# Patient Record
Sex: FEMALE | Race: WHITE | Employment: UNEMPLOYED | URBAN - METROPOLITAN AREA
[De-identification: names, ages, dates, MRNs, and addresses within clinical notes are randomized per-mention and may not be internally consistent; named-entity substitution may affect disease eponyms.]

---

## 2018-04-18 ENCOUNTER — TELEPHONE (OUTPATIENT)
Dept: OBGYN CLINIC | Age: 38
End: 2018-04-18

## 2018-04-18 NOTE — TELEPHONE ENCOUNTER
Jessica King LPN        Caller: Unspecified (Today,  8:37 AM)                     Dr Mukul Kline called and spoke with the patient.

## 2018-04-18 NOTE — TELEPHONE ENCOUNTER
Patient calling stating that this morning she felt pressure and sharp pain in her LLQ and during her bowel movement it felt like something ripped and then she noticed she has having vaginal bleeding that is a medium flow. Patient reports that she has residual pain still and is concerned. There is an  Opening today, does patient need an US or just an office visit?     Please advise

## 2018-06-28 ENCOUNTER — OFFICE VISIT (OUTPATIENT)
Dept: OBGYN CLINIC | Age: 38
End: 2018-06-28

## 2018-06-28 VITALS
WEIGHT: 148.13 LBS | BODY MASS INDEX: 22.52 KG/M2 | SYSTOLIC BLOOD PRESSURE: 118 MMHG | DIASTOLIC BLOOD PRESSURE: 70 MMHG

## 2018-06-28 DIAGNOSIS — Z01.419 ENCOUNTER FOR GYNECOLOGICAL EXAMINATION WITHOUT ABNORMAL FINDING: Primary | ICD-10-CM

## 2018-06-28 NOTE — PROGRESS NOTES
Annual exam ages 21-44    Bea Vo is a ,  41 y.o. female PATIENT REFUSED Patient's last menstrual period was 2018 (approximate). .    She presents for her annual checkup. She is having no significant problems. Kids now 8, 8, 5 and twins 4. Menstrual status:    Her periods are moderate in flow. She is using three to five pads or tampons per day, usually regular and last 26-30 days. She denies dysmenorrhea. She reports no premenstrual symptoms. Contraception:    The current method of family planning is vasectomy. Sexual history:     She  reports that she currently engages in sexual activity and has had male partners. She reports using the following method of birth control/protection: Surgical..    Medical conditions:    Since her last annual GYN exam about one year ago, she has not the following changes in her health history: none. Pap and Mammogram History:    Her most recent Pap smear was normal, obtained 1 year(s) ago. Breast Cancer History/Substance Abuse: negative    Past Medical History:   Diagnosis Date    Abnormal Pap smear      had leep    Phlebitis and thrombophlebitis of unspecified site     pubic area and lower leg varicosities     Past Surgical History:   Procedure Laterality Date    HX DILATION AND CURETTAGE      HX HERNIA REPAIR  child    umbilical    HX LEEP PROCEDURE      HX WISDOM TEETH EXTRACTION         Current Outpatient Prescriptions   Medication Sig Dispense Refill    MULTIVITAMIN PO Take  by mouth.  fish oil-dha-epa (FISH OIL) 1,200-144-216 mg Cap Take 1 Cap by mouth daily.  oxyCODONE-acetaminophen (PERCOCET) 5-325 mg per tablet Take 1 Tab by mouth every four (4) hours as needed for Pain. 20 Tab 0    ibuprofen (MOTRIN) 600 mg tablet Take 1 Tab by mouth every eight (8) hours as needed for Pain. 36 Tab 2    prenatal vit-iron fumarate-fa (PRENATAL VITAMIN) 27-0.8 mg Tab tablet Take 1 Tab by mouth daily.        Allergies: Grapefruit     Tobacco History:  reports that she has never smoked. She has never used smokeless tobacco.  Alcohol Abuse:  reports that she drinks alcohol. Drug Abuse:  reports that she does not use illicit drugs.     Family Medical/Cancer History:   Family History   Problem Relation Age of Onset    Asthma Father     Heart Disease Father     Hypertension Father     Asthma Brother         Review of Systems - History obtained from the patient  Constitutional: negative for weight loss, fever, night sweats  HEENT: negative for hearing loss, earache, congestion, snoring, sorethroat  CV: negative for chest pain, palpitations, edema  Resp: negative for cough, shortness of breath, wheezing  GI: negative for change in bowel habits, abdominal pain, black or bloody stools  : negative for frequency, dysuria, hematuria, vaginal discharge  MSK: negative for back pain, joint pain, muscle pain  Breast: negative for breast lumps, nipple discharge, galactorrhea  Skin :negative for itching, rash, hives  Neuro: negative for dizziness, headache, confusion, weakness  Psych: negative for anxiety, depression, change in mood  Heme/lymph: negative for bleeding, bruising, pallor    Physical Exam    Visit Vitals    /70 (BP 1 Location: Left arm, BP Patient Position: Sitting)    Wt 148 lb 2 oz (67.2 kg)    LMP 06/25/2018 (Approximate)    Breastfeeding No    BMI 22.52 kg/m2       Constitutional  · Appearance: well-nourished, well developed, alert, in no acute distress    HENT  · Head and Face: appears normal    Chest  · Respiratory Effort: breathing unlabored    Breasts  · Inspection of Breasts: breasts symmetrical, no skin changes, no discharge present, nipple appearance normal, no skin retraction present  · Palpation of Breasts and Axillae: no masses present on palpation, no breast tenderness  · Axillary Lymph Nodes: no lymphadenopathy present    Gastrointestinal  · Abdominal Examination: abdomen non-tender to palpation, normal bowel sounds, no masses present  · Liver and spleen: no hepatomegaly present, spleen not palpable  · Hernias: no hernias identified    Genitourinary  · External Genitalia: normal appearance for age, no discharge present, no tenderness present, no inflammatory lesions present, no masses present, no atrophy present  · Vagina: normal vaginal vault without central or paravaginal defects, no discharge present, no inflammatory lesions present, no masses present  · Bladder: non-tender to palpation  · Urethra: appears normal  · Cervix: normal   · Uterus: normal size, shape and consistency  · Adnexa: no adnexal tenderness present, no adnexal masses present  · Perineum: perineum within normal limits, no evidence of trauma, no rashes or skin lesions present  · Anus: anus within normal limits, no hemorrhoids present  · Inguinal Lymph Nodes: no lymphadenopathy present    Skin  · General Inspection: no rash, no lesions identified    Neurologic/Psychiatric  · Mental Status:  · Orientation: grossly oriented to person, place and time  · Mood and Affect: mood normal, affect appropriate    . Assessment:  Routine gynecologic examination  Her current medical status is satisfactory with no evidence of significant gynecologic issues.     Plan:  GC/ chlamydia offered and declined  Counseled re: diet, exercise, healthy lifestyle  Return for yearly wellness visits  Gardisil counseling provided  Rec screening mammo at either 36 or 41

## 2018-06-28 NOTE — PATIENT INSTRUCTIONS
Pelvic Exam: Care Instructions  Your Care Instructions    When your doctor examines all of your pelvic organs, it's called a pelvic exam. Two good reasons to have this kind of exam are to check for sexually transmitted infections (STIs) and to get a Pap test. A Pap test is also called a Pap smear. It checks for early changes that can lead to cancer of the cervix. Sometimes a pelvic exam is part of a regular checkup. In this case, you can do some things to make your test results as accurate as possible. · Try to schedule the exam when you don't have your period. · Don't use douches, tampons, or vaginal medicines, sprays, or powders for 24 hours before your exam.  · Don't have sex for 24 hours before your exam.  Other times, women have this kind of exam at any time of the month. This is because they have pelvic pain, bleeding, or discharge. Or they may have another pelvic problem. Before your exam, it's important to share some information with your doctor. For example, if you are a survivor of rape or sexual abuse, you can talk about any concerns you may have. Your doctor will also want to know if you are pregnant or use birth control. And he or she will want to hear about any problems, surgeries, or procedures you have had in your pelvic area. You will also need to tell your doctor when your last period was. Follow-up care is a key part of your treatment and safety. Be sure to make and go to all appointments, and call your doctor if you are having problems. It's also a good idea to know your test results and keep a list of the medicines you take. How is a pelvic exam done? · During a pelvic exam, you will:  ¨ Take off your clothes below the waist. You will get a paper or cloth cover to put over the lower half of your body. Jesusita Hirsch on your back on an exam table. Your feet will be raised above you. Stirrups will support your feet. · The doctor will:  Venancio Stanton you to relax your knees.  Your knees need to lean out, toward the walls. ¨ Check the opening of your vagina for sores or swelling. ¨ Gently put a tool called a speculum into your vagina. It opens the vagina a little bit. You will feel some pressure. But if you are relaxed, it will not hurt. It lets your doctor see inside the vagina. ¨ Use a small brush, spatula, or swab to get a sample of cells, if you are having a Pap test or culture. The doctor then removes the speculum. ¨ Put on gloves and put one or two fingers of one hand into your vagina. The other hand goes on your lower belly. This lets your doctor feel your pelvic organs. You will probably feel some pressure. Try to stay relaxed. ¨ Put one gloved finger into your rectum and one into your vagina, if needed. This can also help check your pelvic organs. This exam takes about 10 minutes. At the end, you will get a washcloth or tissue to clean your vaginal area. It's normal to have some discharge after this exam. You can then get dressed. Some test results may be ready right away. But results from a culture or a Pap test may take several days or a few weeks. Why should you have a pelvic exam?  · You want to have recommended screening tests. This includes a Pap test.  · You think you have a vaginal infection. Signs include itching, burning, or unusual discharge. · You might have been exposed to a sexually transmitted infection (STI), such as chlamydia or herpes. · You have vaginal bleeding that is not part of your normal menstrual period. · You have pain in your belly or pelvis. · You have been sexually assaulted. A pelvic exam lets your doctor collect evidence and check for STIs. · You are pregnant. · You are having trouble getting pregnant. What are the risks of a pelvic exam?  There are no risks from a pelvic exam.  When should you call for help? Watch closely for changes in your health, and be sure to contact your doctor if you have any problems. Where can you learn more?   Go to http://loni-jemma.info/. Enter W846 in the search box to learn more about \"Pelvic Exam: Care Instructions. \"  Current as of: October 13, 2016  Content Version: 11.4  © 9435-3009 [employer], [employer]. Care instructions adapted under license by [employer] (which disclaims liability or warranty for this information). If you have questions about a medical condition or this instruction, always ask your healthcare professional. [place] [address] any warranty or liability for your use of this information.

## 2018-07-03 LAB
CYTOLOGIST CVX/VAG CYTO: NORMAL
CYTOLOGY CVX/VAG DOC THIN PREP: NORMAL
DX ICD CODE: NORMAL
LABCORP, 190119: NORMAL
Lab: NORMAL
Lab: NORMAL
OTHER STN SPEC: NORMAL
PATH REPORT.FINAL DX SPEC: NORMAL
STAT OF ADQ CVX/VAG CYTO-IMP: NORMAL

## 2018-10-22 ENCOUNTER — OFFICE VISIT (OUTPATIENT)
Dept: NEUROLOGY | Age: 38
End: 2018-10-22

## 2018-10-22 VITALS
WEIGHT: 149.1 LBS | DIASTOLIC BLOOD PRESSURE: 60 MMHG | SYSTOLIC BLOOD PRESSURE: 106 MMHG | OXYGEN SATURATION: 99 % | HEIGHT: 68 IN | BODY MASS INDEX: 22.6 KG/M2 | RESPIRATION RATE: 16 BRPM | HEART RATE: 82 BPM

## 2018-10-22 DIAGNOSIS — H47.10 PAPILLEDEMA: Primary | ICD-10-CM

## 2018-10-22 NOTE — PROGRESS NOTES
Papilledema. Neurology Consult      Subjective:      Lila Kline is a 41 y.o. female Who comes in today with the following history. Says that she has had headaches for about 2 and half years following the birth of her twins. Would have this positional type dizziness and nausea that haunted her but all workups and investigations were unrevealing. Says she is feeling some better. Described a head tightness feeling that comes and goes about every other day and a variable pressures feeling in her ears. Recently saw Dr. Luisa Pedroza of ENT who reassured her everything looked normal.  Her weight has been steady-state and she is on a very simplistic medication schedule and does not use any homeopathic medicines etc. vision has been okay but occasionally slightly out of focus but no breakthrough diplopia amaurosis or or true transient visual obscurations etc. has noticed some very self-limited and random changes of hearing in the ears but it is not predictable or trending. Maybe once a week she may hear her pulse in her ears? Interestingly just had a routine eye visit and mild papilledema was discovered with plans tomorrow to get automated perimetry in that office. Fundus photos were very impressive and graphic. Says that prior to her last eye visit, 14 months ago she had a different eye checkup with a different group and everything was [place]. Is due to get a brain MRI with attention to the orbits with and without contrast that is pending and please see discussion below. Current Outpatient Medications   Medication Sig Dispense Refill    MULTIVITAMIN PO Take  by mouth.  fish oil-dha-epa (FISH OIL) 1,200-144-216 mg Cap Take 1 Cap by mouth daily.         Allergies   Allergen Reactions    Grapefruit Swelling     Throat swelling     Past Medical History:   Diagnosis Date    Abnormal Pap smear     2003 had leep    Headache     Phlebitis and thrombophlebitis of unspecified site     pubic area and lower leg varicosities    Tinnitus       Past Surgical History:   Procedure Laterality Date    HX DILATION AND CURETTAGE      HX HERNIA REPAIR  child    umbilical    HX LEEP PROCEDURE      HX WISDOM TEETH EXTRACTION        Social History     Socioeconomic History    Marital status:      Spouse name: Not on file    Number of children: Not on file    Years of education: Not on file    Highest education level: Not on file   Social Needs    Financial resource strain: Not on file    Food insecurity - worry: Not on file    Food insecurity - inability: Not on file   [place] needs - medical: Not on file   [place] needs - non-medical: Not on file   Occupational History    Not on file   Tobacco Use    Smoking status: Never Smoker    Smokeless tobacco: Never Used   Substance and Sexual Activity    Alcohol use: Yes     Alcohol/week: 0.6 oz     Types: 1 Glasses of wine per week    Drug use: No    Sexual activity: Yes     Partners: Male     Birth control/protection: Surgical     Comment: Vasectomy   Other Topics Concern    Not on file   Social History Narrative    Not on file      Family History   Problem Relation Age of Onset    Asthma Father     Heart Disease Father     Hypertension Father     Asthma Brother     Cancer Maternal Grandmother     Cancer Maternal Grandfather     Stroke Paternal Grandmother     Seizures Paternal Grandfather       Visit Vitals  /60   Pulse 82   Resp 16   Ht 5' 8\" (1.727 m)   Wt 67.6 kg (149 lb 1.6 oz)   LMP 10/08/2018 (Approximate)   SpO2 99%   BMI 22.67 kg/m²        Review of Systems:   A comprehensive review of systems was negative except for that written in the HPI. Neuro Exam:     Appearance: The patient is well developed, well nourished, provides a coherent history and is in no acute distress. Mental Status: Oriented to time, place and person. Mood and affect appropriate. Cranial Nerves:   Intact visual fields.  Fundi are remarkable for circumpapillary mild disc elevation and very interesting tortuous veins and arterial systems. Noteworthy, at one point in each eye, especially on the right, I thought I saw a very transient admittedly obscure venous pulsations. BEATRIS, EOM's full, no nystagmus, no ptosis. Facial sensation is normal. Corneal reflexes are intact. Facial movement is symmetric. Hearing is normal bilaterally. Palate is midline with normal sternocleidomastoid and trapezius muscles are normal. Tongue is midline. Visual acuity near with correction 20/20 OU and Amsler grid normal.   Motor:  5/5 strength in upper and lower proximal and distal muscles. Normal bulk and tone. No fasciculations. Reflexes:   Deep tendon reflexes 2+/4 and symmetrical.   Sensory:   Normal to touch, pinprick and vibration. Gait:  Normal gait. Tremor:   No tremor noted. Cerebellar:  No cerebellar signs present. Neurovascular:  Normal heart sounds and regular rhythm, peripheral pulses intact, and no carotid bruits. Assessment:   Papilledema. A very interesting story with some atypicality to it no doubt. As it is, is scheduled for a brain MRI with orbital cuts and would like to add a brain MRV to the imaging process as well. Beyond that would like to get these results and make our next clinical decision making. Plan:   Revisit pended for now. Depending on initial imaging results could include an LP with opening pressure and check of spinal fluid constituents.   Signed by :  Juan C Fleming MD

## 2018-10-22 NOTE — PATIENT INSTRUCTIONS
[facility]   Statement to Patients  April 1, 2014      In an effort to ensure the large volume of patient prescription refills is processed in the most efficient and expeditious manner, we are asking our patients to assist us by calling your Pharmacy for all prescription refills, this will include also your  Mail Order Pharmacy. The pharmacy will contact our office electronically to continue the refill process. Please do not wait until the last minute to call your pharmacy. We need at least 48 hours (2days) to fill prescriptions. We also encourage you to call your pharmacy before going to  your prescription to make sure it is ready. With regard to controlled substance prescription refill requests (narcotic refills) that need to be picked up at our office, we ask your cooperation by providing us with at least 72 hours (3days) notice that you will need a refill. We will not refill narcotic prescription refill requests after 4:00pm on any weekday, Monday through Thursday, or after 2:00pm on Fridays, or on the weekends. We encourage everyone to explore another way of getting your prescription refill request processed using Partnered, our patient web portal through our electronic medical record system. Partnered is an efficient and effective way to communicate your medication request directly to the office and  downloadable as an jorge on your smart phone . Partnered also features a review functionality that allows you to view your medication list as well as leave messages for your physician. Are you ready to get connected? If so please review the attatched instructions or speak to any of our staff to get you set up right away! Thank you so much for your cooperation. Should you have any questions please contact our Practice Administrator.     The Physicians and Staff,  [facility]                A Healthy Lifestyle: Care Instructions  Your Care Instructions    A healthy lifestyle can help you feel good, stay at a healthy weight, and have plenty of energy for both work and play. A healthy lifestyle is something you can share with your whole family. A healthy lifestyle also can lower your risk for serious health problems, such as high blood pressure, heart disease, and diabetes. You can follow a few steps listed below to improve your health and the health of your family. Follow-up care is a key part of your treatment and safety. Be sure to make and go to all appointments, and call your doctor if you are having problems. It's also a good idea to know your test results and keep a list of the medicines you take. How can you care for yourself at home? · Do not eat too much sugar, fat, or fast foods. You can still have dessert and treats now and then. The goal is moderation. · Start small to improve your eating habits. Pay attention to portion sizes, drink less juice and soda pop, and eat more fruits and vegetables. ? Eat a healthy amount of food. A 3-ounce serving of meat, for example, is about the size of a deck of cards. Fill the rest of your plate with vegetables and whole grains. ? Limit the amount of soda and sports drinks you have every day. Drink more water when you are thirsty. ? Eat at least 5 servings of fruits and vegetables every day. It may seem like a lot, but it is not hard to reach this goal. A serving or helping is 1 piece of fruit, 1 cup of vegetables, or 2 cups of leafy, raw vegetables. Have an apple or some carrot sticks as an afternoon snack instead of a candy bar. Try to have fruits and/or vegetables at every meal.  · Make exercise part of your daily routine. You may want to start with simple activities, such as walking, bicycling, or slow swimming. Try to be active 30 to 60 minutes every day. You do not need to do all 30 to 60 minutes all at once. For example, you can exercise 3 times a day for 10 or 20 minutes.  Moderate exercise is safe for most people, but it is always a good idea to talk to your doctor before starting an exercise program.  · Keep moving. Jereld Mcardle the lawn, work in the garden, or [employer]. Take the stairs instead of the elevator at work. · If you smoke, quit. People who smoke have an increased risk for heart attack, stroke, cancer, and other lung illnesses. Quitting is hard, but there are ways to boost your chance of quitting tobacco for good. ? Use nicotine gum, patches, or lozenges. ? Ask your doctor about stop-smoking programs and medicines. ? Keep trying. In addition to reducing your risk of diseases in the future, you will notice some benefits soon after you stop using tobacco. If you have shortness of breath or asthma symptoms, they will likely get better within a few weeks after you quit. · Limit how much alcohol you drink. Moderate amounts of alcohol (up to 2 drinks a day for men, 1 drink a day for women) are okay. But drinking too much can lead to liver problems, high blood pressure, and other health problems. Family health  If you have a family, there are many things you can do together to improve your health. · Eat meals together as a family as often as possible. · Eat healthy foods. This includes fruits, vegetables, lean meats and dairy, and whole grains. · Include your family in your fitness plan. Most people think of activities such as jogging or tennis as the way to fitness, but there are many ways you and your family can be more active. Anything that makes you breathe hard and gets your heart pumping is exercise. Here are some tips:  ? Walk to do errands or to take your child to school or the bus.  ? Go for a family bike ride after dinner instead of watching TV. Where can you learn more? Go to http://loni-jemma.info/. Enter F362 in the search box to learn more about \"A Healthy Lifestyle: Care Instructions. \"  Current as of: December 7, 2017  Content Version: 11.8  © 0944-0778 Health[employer]. Care instructions adapted under license by [employer] (which disclaims liability or warranty for this information). If you have questions about a medical condition or this instruction, always ask your healthcare professional. [address] any warranty or liability for your use of this information. Learning About Living [place]  What is a living will? A living will is a legal form you use to write down the kind of care you want at the end of your life. It is used by the health professionals who will treat you if you aren't able to decide for yourself. If you put your wishes in writing, your loved ones and others will know what kind of care you want. They won't need to guess. This can ease your mind and be helpful to others. A living will is not the same as an estate or property will. An estate will explains what you want to happen with your money and property after you die. Is a living will a legal document? A living will is a legal document. Each state has its own laws about living [place]. If you move to another state, make sure that your living will is legal in the state where you now live. Or you might use a universal form that has been approved by many states. This kind of form can sometimes be completed and stored online. Your electronic copy will then be available wherever you have a connection to the Internet. In most cases, doctors will respect your wishes even if you have a form from a different state. · You don't need an  to complete a living will. But legal advice can be helpful if your state's laws are unclear, your health history is complicated, or your family can't agree on what should be in your living will. · You can change your living will at any time. Some people find that their wishes about end-of-life care change as their health changes.   · In addition to making a living will, think about completing a medical power of  form. This form lets you name the person you want to make end-of-life treatment decisions for you (your \"health care agent\") if you're not able to. Many hospitals and nursing homes will give you the forms you need to complete a living will and a medical power of . · Your living will is used only if you can't make or communicate decisions for yourself anymore. If you become able to make decisions again, you can accept or refuse any treatment, no matter what you wrote in your living will. · Your state may offer an online registry. This is a place where you can store your living will online so the doctors and nurses who need to treat you can find it right away. What should you think about when creating a living will? Talk about your end-of-life wishes with your family members and your doctor. Let them know what you want. That way the people making decisions for you won't be surprised by your choices. Think about these questions as you make your living will:  · Do you know enough about life support methods that might be used? If not, talk to your doctor so you know what might be done if you can't breathe on your own, your heart stops, or you're unable to swallow. · What things would you still want to be able to do after you receive life-support methods? Would you want to be able to walk? To speak? To eat on your own? To live without the help of machines? · If you have a choice, where do you want to be cared for? In your home? At a hospital or nursing home? · Do you want certain Latter-day practices performed if you become very ill? · If you have a choice at the end of your life, where would you prefer to die? At home? In a hospital or nursing home? Somewhere else? · Would you prefer to be buried or cremated? · Do you want your organs to be donated after you die? What should you do with your living will?   · Make sure that your family members and your health care agent have copies of your living will. · Give your doctor a copy of your living will to keep in your medical record. If you have more than one doctor, make sure that each one has a copy. · You may want to put a copy of your living will where it can be easily found. Where can you learn more? Go to http://loni-jemma.info/. Enter W232 in the search box to learn more about \"Learning About Living Bubba Ansari. \"  Current as of: April 19, 2018  Content Version: 11.8  © 8492-5669 [employer]. Care instructions adapted under license by [employer] (which disclaims liability or warranty for this information). If you have questions about a medical condition or this instruction, always ask your healthcare professional. [address] any warranty or liability for your use of this information. Patient history reviewed and patient examined. I can see where the case has materialized in recent times. We will check on the brain MRI scheduling and would like to add a brain MRV as well. Once this is accomplished we will decide what the next step is. An interesting case where the patient does not have the usual body habitus/story for idiopathic intracranial  hypertension at first glance.

## 2018-10-30 ENCOUNTER — HOSPITAL ENCOUNTER (OUTPATIENT)
Dept: MRI IMAGING | Age: 38
Discharge: HOME | End: 2018-10-30
Attending: OPHTHALMOLOGY
Payer: COMMERCIAL

## 2018-10-30 ENCOUNTER — HOSPITAL ENCOUNTER (OUTPATIENT)
Dept: MRI IMAGING | Age: 38
Discharge: HOME | End: 2018-10-30
Attending: SPECIALIST
Payer: COMMERCIAL

## 2018-10-30 DIAGNOSIS — H47.10 PAPILLEDEMA: ICD-10-CM

## 2018-10-30 PROCEDURE — 70551 MRI BRAIN STEM W/O DYE: CPT

## 2018-10-30 PROCEDURE — 70553 MRI BRAIN STEM W/O & W/DYE: CPT

## 2018-10-30 PROCEDURE — A9575 INJ GADOTERATE MEGLUMI 0.1ML: HCPCS | Performed by: SPECIALIST

## 2018-10-30 PROCEDURE — 74011250636 HC RX REV CODE- 250/636: Performed by: SPECIALIST

## 2018-10-30 RX ORDER — GADOTERATE MEGLUMINE 376.9 MG/ML
12 INJECTION INTRAVENOUS
Status: COMPLETED | OUTPATIENT
Start: 2018-10-30 | End: 2018-10-30

## 2018-10-30 RX ADMIN — GADOTERATE MEGLUMINE 12 ML: 376.9 INJECTION INTRAVENOUS at 15:59

## 2018-11-05 DIAGNOSIS — G93.2 IIH (IDIOPATHIC INTRACRANIAL HYPERTENSION): Primary | ICD-10-CM

## 2018-11-13 ENCOUNTER — HOSPITAL ENCOUNTER (OUTPATIENT)
Dept: GENERAL RADIOLOGY | Age: 38
Discharge: HOME | End: 2018-11-13
Attending: SPECIALIST
Payer: COMMERCIAL

## 2018-11-13 VITALS
HEART RATE: 67 BPM | SYSTOLIC BLOOD PRESSURE: 106 MMHG | RESPIRATION RATE: 16 BRPM | DIASTOLIC BLOOD PRESSURE: 71 MMHG | OXYGEN SATURATION: 100 %

## 2018-11-13 DIAGNOSIS — G93.2 IIH (IDIOPATHIC INTRACRANIAL HYPERTENSION): ICD-10-CM

## 2018-11-13 LAB
APPEARANCE CSF: CLEAR
APPEARANCE CSF: CLEAR
COLOR CSF: COLORLESS
COLOR CSF: COLORLESS
CRYPTOC AG CSF QL IA: NEGATIVE
GLUCOSE CSF-MCNC: 55 MG/DL (ref 40–70)
PROT CSF-MCNC: 38 MG/DL (ref 15–45)
RBC # CSF: 0 /CU MM
RBC # CSF: 0 /CU MM
TUBE # CSF: 1
TUBE # CSF: 3
WBC # CSF: 0 /CU MM (ref 0–5)
WBC # CSF: 0 /CU MM (ref 0–5)

## 2018-11-13 PROCEDURE — 87205 SMEAR GRAM STAIN: CPT

## 2018-11-13 PROCEDURE — 88108 CYTOPATH CONCENTRATE TECH: CPT

## 2018-11-13 PROCEDURE — 89050 BODY FLUID CELL COUNT: CPT

## 2018-11-13 PROCEDURE — 82945 GLUCOSE OTHER FLUID: CPT

## 2018-11-13 PROCEDURE — 74011250636 HC RX REV CODE- 250/636: Performed by: RADIOLOGY

## 2018-11-13 PROCEDURE — 77030003666 HC NDL SPINAL BD -A

## 2018-11-13 PROCEDURE — 62270 DX LMBR SPI PNXR: CPT

## 2018-11-13 PROCEDURE — 84157 ASSAY OF PROTEIN OTHER: CPT

## 2018-11-13 PROCEDURE — 87327 CRYPTOCOCCUS NEOFORM AG IA: CPT

## 2018-11-13 PROCEDURE — 77030019701 HC TY LUMBR PUNC SIMS -A

## 2018-11-13 PROCEDURE — 86618 LYME DISEASE ANTIBODY: CPT

## 2018-11-13 PROCEDURE — 86592 SYPHILIS TEST NON-TREP QUAL: CPT

## 2018-11-13 RX ORDER — LIDOCAINE HYDROCHLORIDE 10 MG/ML
10 INJECTION, SOLUTION EPIDURAL; INFILTRATION; INTRACAUDAL; PERINEURAL
Status: COMPLETED | OUTPATIENT
Start: 2018-11-13 | End: 2018-11-13

## 2018-11-13 RX ADMIN — LIDOCAINE HYDROCHLORIDE 5 ML: 10 INJECTION, SOLUTION EPIDURAL; INFILTRATION; INTRACAUDAL; PERINEURAL at 10:49

## 2018-11-13 NOTE — DISCHARGE INSTRUCTIONS
[address]    POST LUMBAR PUNCTURE/MYELOGRAM/INTRATHECAL CHEMOTHERAPY DISCHARGE INSTRUCTIONS  General Information:  Lumbar Puncture: A LP is done to help diagnose several disorders, like pseudo tumor, migraines, meningitis, and multiple sclerosis. It involves a puncture (usually in the lower spine) into the sac that protects the spinal column. A sample of the fluid in that space is removed and tested in the lab. Myelogram:   A Myelogram involves a lumbar puncture, and instead of removing fluid, contrast will be injected into the sac surrounding the spinal column. It is done to visualize the spinal column, nerve roots, spinal canal, vertebral discs and disc space. It is usually done to diagnose back pain with unknown cause or in preparation for surgery. After the injection, a CT scan will be done, usually within two hours of the injection. Intrathecal Chemotherapy:   Chemotherapy can be given in many forms. Intrathecal chemo involves a lumbar puncture, and instead of removing fluid, the chemo will be injected into the space. After any of these procedures, you will be asked to lie flat on your back for 4-6 hours to prevent complications. You should also rest for 24 hours after you go home, and force fluids. If you have a headache, you should take Tylenol or acetaminophen. Call If:   You should call your Physician and/or the Radiology Nurse if you develop a headache that is not relieved by Tylenol, and worsens when you stand and eases when you lie down, you need to call. You may have developed what is referred to as a spinal headache. Our physicians will probably advise you to be on strict bed rest for 24 hours, to drink lots of fluids and caffeine. If this does not help the head pain, call again the next day. You should call if you have bleeding other than a small spot on your bandage.  You should call if you have any numbness, tingling, weakness, fever, chills, urinary retention, severe itching, rash, welts, swelling, or confusion. Follow-up Instructions: See the doctor who ordered your procedure as he/she has instructed. If you had a Lumbar Puncture or Myelogram, your results should be available to your ordering doctor in 3-5 business days. You can remove your dressing in 24 hours and shower regularly. Do not bathe or swim for 72 hours.    To Reach Us: [phone]  After Hours: [phone]  Patient Signature:  Date: 11/13/2018  Discharging Nurse: Mike Araujo RN

## 2018-11-13 NOTE — PROGRESS NOTES
Pt received to [facility] on stretcher, states no pain, had a potential vagal response during procedure. Pt feels she is returning to normal, cool cloth on head, vitals stable. Toeprettyating PO, pt's family updated of status. Dressing on back CDI. D/c directions given/reviewed with patient. 1140--Taken out in wheelchair for discharge.

## 2018-11-15 LAB — REAGIN AB CSF QL: NON REACTIVE

## 2018-11-16 DIAGNOSIS — G93.2 IIH (IDIOPATHIC INTRACRANIAL HYPERTENSION): ICD-10-CM

## 2018-11-20 LAB
BACTERIA SPEC CULT: NORMAL
BACTERIA SPEC CULT: NORMAL
GRAM STN SPEC: NORMAL
SERVICE CMNT-IMP: NORMAL

## 2018-11-21 LAB
B BURGDOR IGG CSF-ACNC: 0.13 INDEX (ref 0–0.09)
B BURGDOR IGM CSF-ACNC: <0.06 INDEX (ref 0–0.06)

## 2018-12-26 ENCOUNTER — OFFICE VISIT (OUTPATIENT)
Dept: NEUROLOGY | Age: 38
End: 2018-12-26

## 2018-12-26 VITALS
BODY MASS INDEX: 22.64 KG/M2 | RESPIRATION RATE: 16 BRPM | HEIGHT: 68 IN | HEART RATE: 71 BPM | OXYGEN SATURATION: 95 % | SYSTOLIC BLOOD PRESSURE: 112 MMHG | DIASTOLIC BLOOD PRESSURE: 62 MMHG | WEIGHT: 149.4 LBS

## 2018-12-26 DIAGNOSIS — R51.9 WORSENING HEADACHES: Primary | ICD-10-CM

## 2018-12-26 NOTE — PROGRESS NOTES
Neurology Consult      Subjective:      Lila Kline is a 41 y.o. female Who comes in today following completion of outpatient testing. This included a normal brain MRV and an LP with an opening pressure of 25 cm but normal spinal fluid constituents. She said she had some difficulties with headache post LP but within a week or 2 that was clearly no longer the case and no headache history to speak of right now. The opening pressure is the limit of normal for adults but she has a very innocent body habitus and body mass index. Says she will pay close attention to any medications or antibiotics she is put on so that there cannot be any correlative effect to raising intracranial pressure. Again currently no history of breakout diplopia amaurosis TVO or similar phenomena. Seem to be very appreciative of the information that was relayed to her and I think she will continue to do well. If per chance she had come in with a headache persistence history with the opening pressure as it was, thought we could default to something less intense such as Topamax. Which could certainly be a responsible preventative headache drug but at the same time a carbonic anhydrase inhibitor as well. As it is we do not even have to concern ourselves with that option. We will see her back in 3 months. I did suggest she acquire a regular eye doctor and I think [facility] is an excellent organization. Current Outpatient Medications   Medication Sig Dispense Refill    MULTIVITAMIN PO Take  by mouth.  fish oil-dha-epa (FISH OIL) 1,200-144-216 mg Cap Take 1 Cap by mouth daily.         Allergies   Allergen Reactions    Grapefruit Swelling     Throat swelling     Past Medical History:   Diagnosis Date    Abnormal Pap smear     2003 had leep    Headache     Phlebitis and thrombophlebitis of unspecified site     pubic area and lower leg varicosities    Tinnitus       Past Surgical History:   Procedure Laterality Date  HX DILATION AND CURETTAGE      HX HERNIA REPAIR  child    umbilical    HX LEEP PROCEDURE      HX WISDOM TEETH EXTRACTION        Social History     Socioeconomic History    Marital status:      Spouse name: Not on file    Number of children: Not on file    Years of education: Not on file    Highest education level: Not on file   Social Needs    Financial resource strain: Not on file    Food insecurity - worry: Not on file    Food insecurity - inability: Not on file   [employer] needs - medical: Not on file   [employer] needs - non-medical: Not on file   Occupational History    Not on file   Tobacco Use    Smoking status: Never Smoker    Smokeless tobacco: Never Used   Substance and Sexual Activity    Alcohol use: Yes     Alcohol/week: 0.6 oz     Types: 1 Glasses of wine per week    Drug use: No    Sexual activity: Yes     Partners: Male     Birth control/protection: Surgical     Comment: Vasectomy   Other Topics Concern    Not on file   Social History Narrative    Not on file      Family History   Problem Relation Age of Onset    Asthma Father     Heart Disease Father     Hypertension Father     Asthma Brother     Cancer Maternal Grandmother     Cancer Maternal Grandfather     Stroke Paternal Grandmother     Seizures Paternal Grandfather       Visit Vitals  /62   Pulse 71   Resp 16   Ht 5' 8\" (1.727 m)   Wt 67.8 kg (149 lb 6.4 oz)   LMP 12/25/2018 (Exact Date)   SpO2 95%   BMI 22.72 kg/m²        Review of Systems:   A comprehensive review of systems was negative except for that written in the HPI. Neuro Exam:     Appearance: The patient is well developed, well nourished, provides a coherent history and is in no acute distress. Mental Status: Oriented to time, place and person. Mood and affect appropriate. Cranial Nerves:   Intact visual fields. Fundi are benign with tortuosity of vessels but evidence of spontaneous venous pulsations in both eyegrounds. BEATRIS, EOM's full, no nystagmus, no ptosis. Facial sensation is normal. Corneal reflexes are intact. Facial movement is symmetric. Hearing is normal bilaterally. Palate is midline with normal sternocleidomastoid and trapezius muscles are normal. Tongue is midline. Visual acuity near with correction 20/20 OU without hesitation and Amsler grid normal.  APD negative. Motor:  5/5 strength in upper and lower proximal and distal muscles. Normal bulk and tone. No fasciculations. Reflexes:   Deep tendon reflexes 2+/4 and symmetrical.   Sensory:   Normal to touch, pinprick and vibration. Gait:  Normal gait. Tremor:   No tremor noted. Cerebellar:  No cerebellar signs present. Neurovascular:  Normal heart sounds and regular rhythm, peripheral pulses intact, and no carotid bruits. Assessment:   History of previously sited headaches dizziness hearing changes self-limited random changes of hearing etc. please see above dictation for the objective outcome of LP and MRV. Is doing well now and really does not have any headaches to speak of. Would like for her to continue following up with [facility] for regular eye checkups and at this point would like to see her back in about 3 months. Exam look great today and again I thought I could see vestiges of spontaneous venous pulsations in both eyegrounds. Plan:   Revisit 3 months.   Signed by :  Hillary Johns MD

## 2019-01-05 ENCOUNTER — HOSPITAL ENCOUNTER (EMERGENCY)
Age: 39
Discharge: HOME | End: 2019-01-05
Attending: EMERGENCY MEDICINE
Payer: COMMERCIAL

## 2019-01-05 ENCOUNTER — HOSPITAL ENCOUNTER (EMERGENCY)
Age: 39
Discharge: STILL ADMITTED OR ADMINISTRATIVE | End: 2019-01-05
Attending: EMERGENCY MEDICINE
Payer: COMMERCIAL

## 2019-01-05 VITALS
OXYGEN SATURATION: 96 % | BODY MASS INDEX: 21.98 KG/M2 | WEIGHT: 145 LBS | SYSTOLIC BLOOD PRESSURE: 106 MMHG | RESPIRATION RATE: 16 BRPM | HEIGHT: 68 IN | TEMPERATURE: 98.4 F | HEART RATE: 79 BPM | DIASTOLIC BLOOD PRESSURE: 70 MMHG

## 2019-01-05 DIAGNOSIS — E87.6 HYPOKALEMIA: ICD-10-CM

## 2019-01-05 DIAGNOSIS — I49.3 PVC (PREMATURE VENTRICULAR CONTRACTION): ICD-10-CM

## 2019-01-05 DIAGNOSIS — I49.1 PAC (PREMATURE ATRIAL CONTRACTION): Primary | ICD-10-CM

## 2019-01-05 LAB
ALBUMIN SERPL-MCNC: 4.1 G/DL (ref 3.5–5)
ALBUMIN/GLOB SERPL: 1.1 {RATIO} (ref 1.1–2.2)
ALP SERPL-CCNC: 48 U/L (ref 45–117)
ALT SERPL-CCNC: 29 U/L (ref 12–78)
ANION GAP SERPL CALC-SCNC: 12 MMOL/L (ref 5–15)
AST SERPL-CCNC: 18 U/L (ref 15–37)
BASOPHILS # BLD: 0 K/UL (ref 0–0.1)
BASOPHILS NFR BLD: 0 % (ref 0–1)
BILIRUB SERPL-MCNC: 0.5 MG/DL (ref 0.2–1)
BUN SERPL-MCNC: 17 MG/DL (ref 6–20)
BUN/CREAT SERPL: 18 (ref 12–20)
CALCIUM SERPL-MCNC: 9.8 MG/DL (ref 8.5–10.1)
CHLORIDE SERPL-SCNC: 104 MMOL/L (ref 97–108)
CO2 SERPL-SCNC: 25 MMOL/L (ref 21–32)
CREAT SERPL-MCNC: 0.92 MG/DL (ref 0.55–1.02)
DIFFERENTIAL METHOD BLD: ABNORMAL
EOSINOPHIL # BLD: 0.1 K/UL (ref 0–0.4)
EOSINOPHIL NFR BLD: 1 % (ref 0–7)
ERYTHROCYTE [DISTWIDTH] IN BLOOD BY AUTOMATED COUNT: 12.3 % (ref 11.5–14.5)
GLOBULIN SER CALC-MCNC: 3.7 G/DL (ref 2–4)
GLUCOSE SERPL-MCNC: 109 MG/DL (ref 65–100)
HCG SERPL QL: NEGATIVE
HCT VFR BLD AUTO: 42.1 % (ref 35–47)
HGB BLD-MCNC: 14 G/DL (ref 11.5–16)
LYMPHOCYTES # BLD: 2.3 K/UL (ref 0.8–3.5)
LYMPHOCYTES NFR BLD: 19 % (ref 12–49)
MAGNESIUM SERPL-MCNC: 1.9 MG/DL (ref 1.6–2.4)
MCH RBC QN AUTO: 30.6 PG (ref 26–34)
MCHC RBC AUTO-ENTMCNC: 33.3 G/DL (ref 30–36.5)
MCV RBC AUTO: 91.9 FL (ref 80–99)
MONOCYTES # BLD: 0.7 K/UL (ref 0–1)
MONOCYTES NFR BLD: 6 % (ref 5–13)
NEUTS SEG # BLD: 8.6 K/UL (ref 1.8–8)
NEUTS SEG NFR BLD: 74 % (ref 32–75)
PLATELET # BLD AUTO: 273 K/UL (ref 150–400)
PMV BLD AUTO: 10 FL (ref 8.9–12.9)
POTASSIUM SERPL-SCNC: 3.4 MMOL/L (ref 3.5–5.1)
PROT SERPL-MCNC: 7.8 G/DL (ref 6.4–8.2)
RBC # BLD AUTO: 4.58 M/UL (ref 3.8–5.2)
SODIUM SERPL-SCNC: 141 MMOL/L (ref 136–145)
TROPONIN I SERPL-MCNC: <0.05 NG/ML
TSH SERPL DL<=0.05 MIU/L-ACNC: 2.64 UIU/ML (ref 0.36–3.74)
WBC # BLD AUTO: 11.6 K/UL (ref 3.6–11)
XXWBCSUS: 0

## 2019-01-05 PROCEDURE — 99284 EMERGENCY DEPT VISIT MOD MDM: CPT

## 2019-01-05 PROCEDURE — 36415 COLL VENOUS BLD VENIPUNCTURE: CPT

## 2019-01-05 PROCEDURE — 84484 ASSAY OF TROPONIN QUANT: CPT

## 2019-01-05 PROCEDURE — 84703 CHORIONIC GONADOTROPIN ASSAY: CPT

## 2019-01-05 PROCEDURE — 93005 ELECTROCARDIOGRAM TRACING: CPT

## 2019-01-05 PROCEDURE — 80053 COMPREHEN METABOLIC PANEL: CPT

## 2019-01-05 PROCEDURE — 85025 COMPLETE CBC W/AUTO DIFF WBC: CPT

## 2019-01-05 PROCEDURE — 84443 ASSAY THYROID STIM HORMONE: CPT

## 2019-01-05 PROCEDURE — 75810000275 HC EMERGENCY DEPT VISIT NO LEVEL OF CARE

## 2019-01-05 PROCEDURE — 74011250637 HC RX REV CODE- 250/637: Performed by: EMERGENCY MEDICINE

## 2019-01-05 PROCEDURE — 83735 ASSAY OF MAGNESIUM: CPT

## 2019-01-05 RX ORDER — POTASSIUM CHLORIDE 750 MG/1
40 TABLET, FILM COATED, EXTENDED RELEASE ORAL
Status: COMPLETED | OUTPATIENT
Start: 2019-01-05 | End: 2019-01-05

## 2019-01-05 RX ADMIN — POTASSIUM CHLORIDE 40 MEQ: 750 TABLET, EXTENDED RELEASE ORAL at 20:06

## 2019-01-06 NOTE — ED TRIAGE NOTES
Started in the middle of last night with feeling like my heart is racing. I got a fitbit for Sudarshan and my heart rate has been over 100 all day and it is usually only 60.  states he has checked her pulse and it was irregular so they decided to come and check it out. I had an LP 6 weeks ago but no problem since then. Denies pain and not short of breath but sometimes my breath catches

## 2019-01-06 NOTE — ED PROVIDER NOTES
42 yo wf with no significant pmhx presents with c/o palpitations. reports woke her from sleep last night and has been on and off all day today.  who is a physician palpated her pulse when she stated she didn't feel good and noticed it was irregular so they came to ED. She denies HA cp, sob, recent travel, hormone therapy, pain with deep breathing, leg swelling, black or bloody stool, heavy menses, taking stimulants or otc energy drinks. She does drink 2 cups of coffee per day but no recent change in what she is drinking. She has not been ill recently and is eating and drinking well Does not smoke 
denies chance of pregnancy Past Medical History:  
Diagnosis Date  Abnormal Pap smear   
 2003 had leep  Headache  Phlebitis and thrombophlebitis of unspecified site   
 pubic area and lower leg varicosities  Tinnitus Past Surgical History:  
Procedure Laterality Date  HX DILATION AND CURETTAGE    
 HX HERNIA REPAIR  child  
 umbilical  
 HX LEEP PROCEDURE    
 HX WISDOM TEETH EXTRACTION Family History:  
Problem Relation Age of Onset  Asthma Father  Heart Disease Father  Hypertension Father  Asthma Brother  Cancer Maternal Grandmother  Cancer Maternal Grandfather  Stroke Paternal Grandmother  Seizures Paternal Grandfather Social History Socioeconomic History  Marital status:  Spouse name: Not on file  Number of children: Not on file  Years of education: Not on file  Highest education level: Not on file Social Needs  Financial resource strain: Not on file  Food insecurity - worry: Not on file  Food insecurity - inability: Not on file  Transportation needs - medical: Not on file  Transportation needs - non-medical: Not on file Occupational History  Not on file Tobacco Use  Smoking status: Never Smoker  Smokeless tobacco: Never Used Substance and Sexual Activity  Alcohol use: Yes Alcohol/week: 0.6 oz Types: 1 Glasses of wine per week  Drug use: No  
 Sexual activity: Yes  
  Partners: Male Birth control/protection: Surgical  
  Comment: Vasectomy Other Topics Concern  Not on file Social History Narrative  Not on file ALLERGIES: Grapefruit Review of Systems Constitutional: Negative for fever. HENT: Negative for sinus pressure. Respiratory: Negative for shortness of breath. Cardiovascular: Positive for palpitations. Negative for chest pain and leg swelling. Gastrointestinal: Negative for diarrhea and nausea. Genitourinary: Negative for menstrual problem. Neurological: Negative for headaches. All other systems reviewed and are negative. Vitals:  
 01/05/19 1902 BP: 138/78 Pulse: 97 Resp: 16 Temp: 98.4 °F (36.9 °C) SpO2: 100% Weight: 65.8 kg (145 lb) Height: 5' 8\" (1.727 m) Physical Exam  
Constitutional: She is oriented to person, place, and time. She appears well-developed and well-nourished. HENT:  
Head: Normocephalic and atraumatic. Eyes: Conjunctivae are normal.  
Neck: Normal range of motion. Cardiovascular: Normal rate and normal heart sounds. Irregular rhythm Pulmonary/Chest: Effort normal and breath sounds normal. No stridor. No respiratory distress. She has no wheezes. She has no rales. Abdominal: Soft. Bowel sounds are normal. She exhibits no distension. There is no tenderness. Musculoskeletal: Normal range of motion. She exhibits no edema or tenderness. Neurological: She is alert and oriented to person, place, and time. No cranial nerve deficit. Skin: Skin is warm and dry. Nursing note and vitals reviewed. MDM Number of Diagnoses or Management Options Diagnosis management comments: Pac's while on monitor in the room. Check hb, electrolytes, tsh, monitor while in ED Will need follow up for outpt holter.  No concern for PE 
 
  
 Amount and/or Complexity of Data Reviewed Clinical lab tests: ordered and reviewed Obtain history from someone other than the patient: yes () Independent visualization of images, tracings, or specimens: yes (ekg) Patient Progress Patient progress: stable Procedures ED EKG interpretation: 
Rhythm: normal sinus rhythm; and regular . Rate (approx.): 85; Axis: normal; P wave: normal; QRS interval: normal ; ST/T wave: non-specific changes;+ pac's 
EKG documented by Katherine Tiwari MD, scribe, as interpreted by Skye Antonio MD, ED MD. 
 
 8:10 PM 
Reviewed telemetry and pt with a few pac's and one pvc. Refer for holter. Treated potassium. Increase potassium rich foods 8:10 PM 
Patient's results have been reviewed with them. Patient and/or family have verbally conveyed their understanding and agreement of the patient's signs, symptoms, diagnosis, treatment and prognosis and additionally agree to follow up as recommended or return to the Emergency Room should their condition change prior to follow-up. Discharge instructions have also been provided to the patient with some educational information regarding their diagnosis as well a list of reasons why they would want to return to the ER prior to their follow-up appointment should their condition change.

## 2019-01-06 NOTE — DISCHARGE INSTRUCTIONS
Patient Education               We hope that we have addressed all of your medical concerns. The examination and treatment you received in the Emergency Department were for an emergent problem and were not intended as complete care. It is important that you follow up with your healthcare provider(s) for ongoing care. If your symptoms worsen or do not improve as expected, and you are unable to reach your usual health care provider(s), you should return to the Emergency Department. Today's healthcare is undergoing tremendous change, and patient satisfaction surveys are one of the many tools to assess the quality of medical care. You may receive a survey from the [employer] organization regarding your experience in the Emergency Department. I hope that your experience has been completely positive, particularly the medical care that I provided. As such, please participate in the survey; anything less than excellent does not meet my expectations or intentions. [facility] and 8 [facility] participate in nationally recognized quality of care measures. If your blood pressure is greater than 120/80, as reported below, we urge that you seek medical care to address the potential of high blood pressure, commonly known as hypertension. Hypertension can be hereditary or can be caused by certain medical conditions, pain, stress, or \"white coat syndrome. \"       Please make an appointment with your health care provider(s) for follow up of your Emergency Department visit. VITALS:   Patient Vitals for the past 8 hrs:   Temp Pulse Resp BP SpO2   01/05/19 1902 98.4 °F (36.9 °C) 97 16 138/78 100 %          Thank you for allowing us to provide you with medical care today. We realize that you have many choices for your emergency care needs. Please choose us in the future for any continued health care needs.       Yola Weller MD    [facility].   Office: [phone]            Recent Results (from the past 24 hour(s))   CBC WITH AUTOMATED DIFF    Collection Time: 01/05/19  7:15 PM   Result Value Ref Range    WBC 11.6 (H) 3.6 - 11.0 K/uL    RBC 4.58 3.80 - 5.20 M/uL    HGB 14.0 11.5 - 16.0 g/dL    HCT 42.1 35.0 - 47.0 %    MCV 91.9 80.0 - 99.0 FL    MCH 30.6 26.0 - 34.0 PG    MCHC 33.3 30.0 - 36.5 g/dL    RDW 12.3 11.5 - 14.5 %    PLATELET 355 878 - 661 K/uL    MPV 10.0 8.9 - 12.9 FL    NEUTROPHILS 74 32 - 75 %    LYMPHOCYTES 19 12 - 49 %    MONOCYTES 6 5 - 13 %    EOSINOPHILS 1 0 - 7 %    BASOPHILS 0 0 - 1 %    ABS. NEUTROPHILS 8.6 (H) 1.8 - 8.0 K/UL    ABS. LYMPHOCYTES 2.3 0.8 - 3.5 K/UL    ABS. MONOCYTES 0.7 0.0 - 1.0 K/UL    ABS. EOSINOPHILS 0.1 0.0 - 0.4 K/UL    ABS. BASOPHILS 0.0 0.0 - 0.1 K/UL    DF AUTOMATED      XXWBCSUS 0     METABOLIC PANEL, COMPREHENSIVE    Collection Time: 01/05/19  7:15 PM   Result Value Ref Range    Sodium 141 136 - 145 mmol/L    Potassium 3.4 (L) 3.5 - 5.1 mmol/L    Chloride 104 97 - 108 mmol/L    CO2 25 21 - 32 mmol/L    Anion gap 12 5 - 15 mmol/L    Glucose 109 (H) 65 - 100 mg/dL    BUN 17 6 - 20 MG/DL    Creatinine 0.92 0.55 - 1.02 MG/DL    BUN/Creatinine ratio 18 12 - 20      GFR est AA >60 >60 ml/min/1.73m2    GFR est non-AA >60 >60 ml/min/1.73m2    Calcium 9.8 8.5 - 10.1 MG/DL    Bilirubin, total 0.5 0.2 - 1.0 MG/DL    ALT (SGPT) 29 12 - 78 U/L    AST (SGOT) 18 15 - 37 U/L    Alk.  phosphatase 48 45 - 117 U/L    Protein, total 7.8 6.4 - 8.2 g/dL    Albumin 4.1 3.5 - 5.0 g/dL    Globulin 3.7 2.0 - 4.0 g/dL    A-G Ratio 1.1 1.1 - 2.2     TROPONIN I    Collection Time: 01/05/19  7:15 PM   Result Value Ref Range    Troponin-I, Qt. <0.05 <0.05 ng/mL   MAGNESIUM    Collection Time: 01/05/19  7:15 PM   Result Value Ref Range    Magnesium 1.9 1.6 - 2.4 mg/dL   TSH 3RD GENERATION    Collection Time: 01/05/19  7:15 PM   Result Value Ref Range    TSH 2.64 0.36 - 3.74 uIU/mL   HCG QL SERUM    Collection Time: 01/05/19  7:44 PM   Result Value Ref Range    HCG, Ql. NEGATIVE  NEG         No results found. Hypokalemia: Care Instructions  Your Care Instructions    Hypokalemia (say \"vn-kv-[id number]-PARI-hugh-uh\") is a low level of potassium. The heart, muscles, kidneys, and nervous system all need potassium to work well. This problem has many different causes. Kidney problems, diet, and medicines like diuretics and laxatives can cause it. So can vomiting or diarrhea. In some cases, cancer is the cause. Your doctor may do tests to find the cause of your low potassium levels. You may need medicines to bring your potassium levels back to normal. You may also need regular blood tests to check your potassium. If you have very low potassium, you may need intravenous (IV) medicines. You also may need tests to check the electrical activity of your heart. Heart problems caused by low potassium levels can be very serious. Follow-up care is a key part of your treatment and safety. Be sure to make and go to all appointments, and call your doctor if you are having problems. It's also a good idea to know your test results and keep a list of the medicines you take. How can you care for yourself at home? · If your doctor recommends it, eat foods that have a lot of potassium. These include fresh fruits, juices, and vegetables. They also include nuts, beans, and milk. · Be safe with medicines. If your doctor prescribes medicines or potassium supplements, take them exactly as directed. Call your doctor if you have any problems with your medicines. · Get your potassium levels tested as often as your doctor tells you. When should you call for help? Call 911 anytime you think you may need emergency care. For example, call if:    · You feel like your heart is missing beats.  Heart problems caused by low potassium can cause death.     · You passed out (lost consciousness).     · You have a seizure.    Call your doctor now or seek immediate medical care if:    · You feel weak or unusually tired.     · You have severe arm or leg cramps.     · You have tingling or numbness.     · You feel sick to your stomach, or you vomit.     · You have belly cramps.     · You feel bloated or constipated.     · You have to urinate a lot.     · You feel very thirsty most of the time.     · You are dizzy or lightheaded, or you feel like you may faint.     · You feel depressed, or you lose touch with reality.    Watch closely for changes in your health, and be sure to contact your doctor if:    · You do not get better as expected. Where can you learn more? Go to http://loni-jemma.info/. Enter G358 in the search box to learn more about \"Hypokalemia: Care Instructions. \"  Current as of: March 15, 2018  Content Version: 11.8  © 2535-6807 [employer]. Care instructions adapted under license by [employer] (which disclaims liability or warranty for this information). If you have questions about a medical condition or this instruction, always ask your healthcare professional. [place] [address] any warranty or liability for your use of this information. Patient Education        Premature Heartbeat: Care Instructions  Your Care Instructions    A premature heartbeat happens when the heart beats earlier than it should. This briefly interrupts the heart's rhythm. You do not usually feel the early heartbeat, and the next beat is stronger. To many people, this feels like a skipped heartbeat or a flutter. This heartbeat is also called a premature ventricular contraction (PVC). If you have no heart problems, premature heartbeats that happen once in a while are not a cause for concern. Most people have them at some time. They may happen more often if you drink a lot of caffeine or alcohol or are under stress. Usually, no cause for a premature heartbeat is found, and no treatment is needed.  Some people may take medicine to prevent these heartbeats and to relieve symptoms. Follow-up care is a key part of your treatment and safety. Be sure to make and go to all appointments, and call your doctor if you are having problems. It's also a good idea to know your test results and keep a list of the medicines you take. How can you care for yourself at home? · Limit caffeine and other stimulants if they trigger premature heartbeats. · Reduce stress. Avoid people and places that make you feel anxious, if you can. Learn ways to reduce stress, such as biofeedback, guided imagery, and meditation. · Do not smoke or allow others to smoke around you. If you need help quitting, talk to your doctor about stop-smoking programs and medicines. These can increase your chances of quitting for good. · Get at least 30 minutes of exercise on most days of the week. Walking is a good choice. You also may want to do other activities, such as running, swimming, cycling, or playing tennis or team sports. · Eat heart-healthy foods. · Stay at a healthy weight. Lose weight if you need to. · Get enough sleep. Keep your room dark and quiet, and try to go to bed at the same time every night. · Limit alcohol to 2 drinks a day for men and 1 drink a day for women. Too much alcohol can cause health problems. If drinking alcohol causes more premature heartbeats, do not drink it. · If your doctor prescribes medicine, take it exactly as prescribed. Call your doctor if you think you are having a problem with your medicine. When should you call for help? Call 911 anytime you think you may need emergency care. For example, call if:    · You passed out (lost consciousness).    Call your doctor now or seek immediate medical care if:    · You are dizzy or lightheaded, or you feel like you may faint.     · You are short of breath.    Watch closely for changes in your health, and be sure to contact your doctor if you have any problems.   Where can you learn more?  Go to http://loni-jemma.info/. Enter K231 in the search box to learn more about \"Premature Heartbeat: Care Instructions. \"  Current as of: December 6, 2017  Content Version: 11.8  © 9450-1340 [employer]. Care instructions adapted under license by [employer] (which disclaims liability or warranty for this information). If you have questions about a medical condition or this instruction, always ask your healthcare professional. [place] [address] any warranty or liability for your use of this information.

## 2019-01-07 ENCOUNTER — TELEPHONE (OUTPATIENT)
Dept: CARDIOLOGY CLINIC | Age: 39
End: 2019-01-07

## 2019-01-07 LAB
ATRIAL RATE: 84 BPM
CALCULATED P AXIS, ECG09: 13 DEGREES
CALCULATED R AXIS, ECG10: 49 DEGREES
CALCULATED T AXIS, ECG11: -3 DEGREES
DIAGNOSIS, 93000: NORMAL
P-R INTERVAL, ECG05: 126 MS
Q-T INTERVAL, ECG07: 392 MS
QRS DURATION, ECG06: 96 MS
QTC CALCULATION (BEZET), ECG08: 463 MS
VENTRICULAR RATE, ECG03: 84 BPM

## 2019-01-07 NOTE — TELEPHONE ENCOUNTER
Pt called to schedule a [place] appointment with Dr. Monique Dyson for Tachycardia. Ref by [facility] ED.   Phone # [phone]  Thanks

## 2019-01-16 ENCOUNTER — HOSPITAL ENCOUNTER (OUTPATIENT)
Dept: GENERAL RADIOLOGY | Age: 39
Discharge: HOME | End: 2019-01-16
Payer: COMMERCIAL

## 2019-01-16 ENCOUNTER — CLINICAL SUPPORT (OUTPATIENT)
Dept: CARDIOLOGY CLINIC | Age: 39
End: 2019-01-16

## 2019-01-16 ENCOUNTER — OFFICE VISIT (OUTPATIENT)
Dept: CARDIOLOGY CLINIC | Age: 39
End: 2019-01-16

## 2019-01-16 VITALS
BODY MASS INDEX: 22.34 KG/M2 | SYSTOLIC BLOOD PRESSURE: 122 MMHG | HEIGHT: 68 IN | HEART RATE: 110 BPM | DIASTOLIC BLOOD PRESSURE: 76 MMHG | WEIGHT: 147.4 LBS | RESPIRATION RATE: 18 BRPM | OXYGEN SATURATION: 99 %

## 2019-01-16 DIAGNOSIS — R00.2 PALPITATIONS: Primary | ICD-10-CM

## 2019-01-16 DIAGNOSIS — R00.2 PALPITATIONS: ICD-10-CM

## 2019-01-16 DIAGNOSIS — R07.9 CHEST PAIN, UNSPECIFIED TYPE: ICD-10-CM

## 2019-01-16 PROCEDURE — 71046 X-RAY EXAM CHEST 2 VIEWS: CPT

## 2019-01-16 NOTE — PROGRESS NOTES
[address]  Complains of short episodes 1-2 times daily since ER visit on 1/5  Has stopped drinking caffeine/ occasionally eats chocolate  With spouse who is a physician    Visit Vitals  /76 (BP 1 Location: Right arm, BP Patient Position: Sitting)   Pulse (!) 110   Resp 18   Ht 5' 8\" (1.727 m)   Wt 147 lb 6.4 oz (66.9 kg)   SpO2 99%   BMI 22.41 kg/m²

## 2019-01-16 NOTE — PROGRESS NOTES
HISTORY OF PRESENTING ILLNESS      Sybil Cameron is a 41 y.o. female who recently presented to the emergency room with palpitation and was noted to have PACs and a PVC referred for further evaluation of her palpitations. Patient reports having intolerance to cardiovascular exercise in the past following exercise immediately with onset of symptoms of lightheadedness and nausea. This has restricted her from exercise until recently when she attempted to restart exercising. She exercised with running however shortly thereafter experienced intermittent palpitations and an awareness of her breathing. This continued the following day at which time her  who is a physician examined her pulse and noted an irregularity. She presents to the emergency room for further evaluation. In the past she has experienced episodes of lightheadedness and nausea beginning 2 years ago while in the grocery store and otherwise feeling well. She has had recurrent episodes similar to this while walking. She denies history of syncope or family history of sudden cardiac death. Her father had myocardial infarction recently however he is in his 62s. Her heart rate tends to be in the 60s at baseline. She had previously undergone workup for suspected intracranial hypertension for which a lumbar puncture was performed. She recently discontinued her multivitamin and fish oil due to her recent symptoms. She has experienced left-sided chest wall discomfort that is particularly noticeable when laying on her left side. EKG demonstrates sinus rhythm today without evidence of ventricular preexcitation. She reports hydrating aggressively. 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        ACTIVE PROBLEM LIST     Patient Active Problem List    Diagnosis Date Noted    Labor and delivery, indication for care 2014    Active labor 2012    Missed  2011           PAST MEDICAL HISTORY Past Medical History:   Diagnosis Date    Abnormal Pap smear     2003 had leep    Headache     Phlebitis and thrombophlebitis of unspecified site     pubic area and lower leg varicosities    Tinnitus            PAST SURGICAL HISTORY     Past Surgical History:   Procedure Laterality Date    HX DILATION AND CURETTAGE      HX HERNIA REPAIR  child    umbilical    HX LEEP PROCEDURE      HX WISDOM TEETH EXTRACTION            ALLERGIES     Allergies   Allergen Reactions    Grapefruit Swelling     Throat swelling          FAMILY HISTORY     Family History   Problem Relation Age of Onset    Asthma Father     Heart Disease Father     Hypertension Father     Asthma Brother     Cancer Maternal Grandmother     Cancer Maternal Grandfather     Stroke Paternal Grandmother     Seizures Paternal Grandfather     negative for cardiac disease       SOCIAL HISTORY     Social History     Socioeconomic History    Marital status:      Spouse name: Not on file    Number of children: Not on file    Years of education: Not on file    Highest education level: Not on file   Tobacco Use    Smoking status: Never Smoker    Smokeless tobacco: Never Used   Substance and Sexual Activity    Alcohol use: Yes     Alcohol/week: 0.6 oz     Types: 1 Glasses of wine per week    Drug use: No    Sexual activity: Yes     Partners: Male     Birth control/protection: Surgical     Comment: Vasectomy         MEDICATIONS     Current Outpatient Medications   Medication Sig    MULTIVITAMIN PO Take  by mouth.  fish oil-dha-epa (FISH OIL) 1,200-144-216 mg Cap Take 1 Cap by mouth daily. No current facility-administered medications for this visit. I have reviewed the nurses notes, vitals, problem list, allergy list, medical history, family, social history and medications. REVIEW OF SYMPTOMS      General: Pt denies excessive weight gain or loss.  Pt is able to conduct ADL's  HEENT: Denies blurred vision, headaches, hearing loss, epistaxis and difficulty swallowing. Respiratory: Denies cough, congestion, shortness of breath, RAMOS, wheezing or stridor. Cardiovascular: Denies precordial pain, palpitations, edema or PND  Gastrointestinal: Denies poor appetite, indigestion, abdominal pain or blood in stool  Genitourinary: Denies hematuria, dysuria, increased urinary frequency  Musculoskeletal: Denies joint pain or swelling from muscles or joints  Neurologic: Denies tremor, paresthesias, headache, or sensory motor disturbance  Psychiatric: Denies confusion, insomnia, depression  Integumentray: Denies rash, itching or ulcers. Hematologic: Denies easy bruising, bleeding       PHYSICAL EXAMINATION      There were no vitals filed for this visit. General: Well developed, in no acute distress. HEENT: No jaundice, oral mucosa moist, no oral ulcers  Neck: Supple, no stiffness, no lymphadenopathy, supple  Heart:  Normal S1/S2 negative S3 or S4. Regular, no murmur, gallop or rub, no jugular venous distention  Respiratory: Clear bilaterally x 4, no wheezing or rales  Abdomen:   Soft, non-tender, bowel sounds are active.   Extremities:  No edema, normal cap refill, no cyanosis. Musculoskeletal: No clubbing, no deformities  Neuro: A&Ox3, speech clear, gait stable, cooperative, no focal neurologic deficits  Skin: Skin color is normal. No rashes or lesions.  Non diaphoretic, moist.  Vascular: 2+ pulses symmetric in all extremities       DIAGNOSTIC DATA      EKG: Sinus rhythm without ventricular preexcitation       LABORATORY DATA      Lab Results   Component Value Date/Time    WBC 11.6 (H) 01/05/2019 07:15 PM    Hemoglobin (POC) 12.9 04/23/2016 03:33 PM    HGB 14.0 01/05/2019 07:15 PM    Hematocrit (POC) 38 04/23/2016 03:33 PM    HCT 42.1 01/05/2019 07:15 PM    PLATELET 504 68/33/7315 07:15 PM    MCV 91.9 01/05/2019 07:15 PM      Lab Results   Component Value Date/Time    Sodium 141 01/05/2019 07:15 PM    Potassium 3.4 (L) 01/05/2019 07:15 PM Chloride 104 01/05/2019 07:15 PM    CO2 25 01/05/2019 07:15 PM    Anion gap 12 01/05/2019 07:15 PM    Glucose 109 (H) 01/05/2019 07:15 PM    BUN 17 01/05/2019 07:15 PM    Creatinine 0.92 01/05/2019 07:15 PM    BUN/Creatinine ratio 18 01/05/2019 07:15 PM    GFR est AA >60 01/05/2019 07:15 PM    GFR est non-AA >60 01/05/2019 07:15 PM    Calcium 9.8 01/05/2019 07:15 PM    Bilirubin, total 0.5 01/05/2019 07:15 PM    AST (SGOT) 18 01/05/2019 07:15 PM    Alk. phosphatase 48 01/05/2019 07:15 PM    Protein, total 7.8 01/05/2019 07:15 PM    Albumin 4.1 01/05/2019 07:15 PM    Globulin 3.7 01/05/2019 07:15 PM    A-G Ratio 1.1 01/05/2019 07:15 PM    ALT (SGPT) 29 01/05/2019 07:15 PM           ASSESSMENT      1. Palpitations  2. Premature atrial contractions  3. Premature ventricular contractions  4. Chest wall discomfort         PLAN     Recommend further cardiac testing to better define the underlying  of her presentation. Given her symptoms occur with exercise we will plan for a 30-day monitor with plan for her to exercise while wearing the monitor. Should the monitor remain unremarkable will planned to arrange for a treadmill exercise test in attempt to re-create her clinical presentation. Will obtain an echocardiogram to evaluate LV function and chamber sizes. Will obtain a chest x-ray to evaluate for a left chest wall discomfort. FOLLOW-UP     After testing        Thank you, Anabella Augustine MD for allowing me to participate in the care of this extraordinarily pleasant female. Please do not hesitate to contact me for further questions/concerns.          Venessa Loja MD  Cardiac Electrophysiology / Cardiology    [facility].  [address] [facility] [place] [facility], [address] [place] [address] 57    [place], [facility] [address]  [phone] / [phone] Fax   [phone] / [phone] Fax

## 2019-02-12 ENCOUNTER — CLINICAL SUPPORT (OUTPATIENT)
Dept: CARDIOLOGY CLINIC | Age: 39
End: 2019-02-12

## 2019-02-12 VITALS
DIASTOLIC BLOOD PRESSURE: 76 MMHG | SYSTOLIC BLOOD PRESSURE: 122 MMHG | WEIGHT: 147 LBS | BODY MASS INDEX: 22.28 KG/M2 | HEIGHT: 68 IN

## 2019-02-12 DIAGNOSIS — R00.2 PALPITATIONS: ICD-10-CM

## 2019-02-12 LAB
ECHO AO ROOT DIAM: 2.74 CM
ECHO EST RA PRESSURE: 3 MMHG
ECHO LA AREA 4C: 18.6 CM2
ECHO LA MAJOR AXIS: 2.97 CM
ECHO LA TO AORTIC ROOT RATIO: 1.09
ECHO LA VOL 2C: 54.91 ML (ref 22–52)
ECHO LA VOL 4C: 48.21 ML (ref 22–52)
ECHO LA VOL BP: 60.01 ML (ref 22–52)
ECHO LA VOL/BSA BIPLANE: 33.47 ML/M2 (ref 16–28)
ECHO LA VOLUME INDEX A2C: 30.62 ML/M2 (ref 16–28)
ECHO LA VOLUME INDEX A4C: 26.88 ML/M2 (ref 16–28)
ECHO LV E' LATERAL VELOCITY: 21.28 CM/S
ECHO LV E' SEPTAL VELOCITY: 13.78 CM/S
ECHO LV EDV A2C: 117.1 ML
ECHO LV EDV A4C: 131.5 ML
ECHO LV EDV BP: 126.4 ML (ref 56–104)
ECHO LV EDV INDEX A4C: 73.3 ML/M2
ECHO LV EDV INDEX BP: 70.5 ML/M2
ECHO LV EDV NDEX A2C: 65.3 ML/M2
ECHO LV EJECTION FRACTION A2C: 50 %
ECHO LV EJECTION FRACTION A4C: 73 %
ECHO LV EJECTION FRACTION BIPLANE: 63 % (ref 55–100)
ECHO LV ESV A2C: 58.8 ML
ECHO LV ESV A4C: 35.2 ML
ECHO LV ESV BP: 46.7 ML (ref 19–49)
ECHO LV ESV INDEX A2C: 32.8 ML/M2
ECHO LV ESV INDEX A4C: 19.6 ML/M2
ECHO LV ESV INDEX BP: 26 ML/M2
ECHO LV INTERNAL DIMENSION DIASTOLIC: 4.98 CM (ref 3.9–5.3)
ECHO LV INTERNAL DIMENSION SYSTOLIC: 3.5 CM
ECHO LV IVSD: 0.82 CM (ref 0.6–0.9)
ECHO LV MASS 2D: 150.4 G (ref 67–162)
ECHO LV MASS INDEX 2D: 83.9 G/M2 (ref 43–95)
ECHO LV POSTERIOR WALL DIASTOLIC: 0.75 CM (ref 0.6–0.9)
ECHO MV A VELOCITY: 52.93 CM/S
ECHO MV AREA PHT: 3.8 CM2
ECHO MV E DECELERATION TIME (DT): 200.2 MS
ECHO MV E VELOCITY: 0.91 CM/S
ECHO MV E/A RATIO: 0.02
ECHO MV E/E' LATERAL: 0.04
ECHO MV E/E' RATIO (AVERAGED): 0.05
ECHO MV E/E' SEPTAL: 0.07
ECHO MV PRESSURE HALF TIME (PHT): 58.1 MS
ECHO PULMONARY ARTERY SYSTOLIC PRESSURE (PASP): 26.3 MMHG
ECHO RA AREA 4C: 14.34 CM2
ECHO RIGHT VENTRICULAR SYSTOLIC PRESSURE (RVSP): 26.3 MMHG
ECHO RV TAPSE: 3.26 CM (ref 1.5–2)
ECHO TV REGURGITANT MAX VELOCITY: 241.12 CM/S
ECHO TV REGURGITANT PEAK GRADIENT: 23.3 MMHG

## 2019-03-04 ENCOUNTER — OFFICE VISIT (OUTPATIENT)
Dept: CARDIOLOGY CLINIC | Age: 39
End: 2019-03-04

## 2019-03-04 VITALS
WEIGHT: 149.4 LBS | BODY MASS INDEX: 22.64 KG/M2 | HEART RATE: 72 BPM | RESPIRATION RATE: 16 BRPM | HEIGHT: 68 IN | SYSTOLIC BLOOD PRESSURE: 110 MMHG | OXYGEN SATURATION: 98 % | DIASTOLIC BLOOD PRESSURE: 72 MMHG

## 2019-03-04 DIAGNOSIS — R00.2 PALPITATIONS: Primary | ICD-10-CM

## 2019-03-04 NOTE — PROGRESS NOTES
HISTORY OF PRESENTING ILLNESS      Moreno Rahman is a 41 y.o. female with palpitations noted to have PAC/PVCs (see clinic note from 2019 for complete details) presenting today to discuss recent monitor findings. Echocardiogram demonstrated normal function. CXR unremarkable. She reports experiencing mild versions of her full blown clinical episodes while wearing the monitor. She did not experience syncope. She reported palpitations when observed to have a PAC however is asymptomatic during another episode of a PAC. She had a full-blown episode of her clinical symptoms after completing the monitor. She is awakened at night by her children frequently however at other times awakens spontaneously with palpitations which she attributes to possible underlying anxiety. She continues to hydrate aggressively. She discontinued her vitamin A which helped improve her headaches.         ACTIVE PROBLEM LIST     Patient Active Problem List    Diagnosis Date Noted    Labor and delivery, indication for care 2014    Active labor 2012    Missed  2011           PAST MEDICAL HISTORY     Past Medical History:   Diagnosis Date    Abnormal Pap smear      had leep    Headache     Phlebitis and thrombophlebitis of unspecified site     pubic area and lower leg varicosities    Tinnitus            PAST SURGICAL HISTORY     Past Surgical History:   Procedure Laterality Date    HX DILATION AND CURETTAGE      HX HERNIA REPAIR  child    umbilical    HX LEEP PROCEDURE      HX WISDOM TEETH EXTRACTION            ALLERGIES     Allergies   Allergen Reactions    Grapefruit Swelling     Throat swelling          FAMILY HISTORY     Family History   Problem Relation Age of Onset    Asthma Father     Heart Disease Father     Hypertension Father     Asthma Brother     Cancer Maternal Grandmother     Cancer Maternal Grandfather     Stroke Paternal Grandmother     Seizures Paternal Grandfather negative for cardiac disease       SOCIAL HISTORY     Social History     Socioeconomic History    Marital status:      Spouse name: Not on file    Number of children: Not on file    Years of education: Not on file    Highest education level: Not on file   Tobacco Use    Smoking status: Never Smoker    Smokeless tobacco: Never Used   Substance and Sexual Activity    Alcohol use: Yes     Alcohol/week: 0.6 oz     Types: 1 Glasses of wine per week    Drug use: No    Sexual activity: Yes     Partners: Male     Birth control/protection: Surgical     Comment: Vasectomy         MEDICATIONS     Current Outpatient Medications   Medication Sig    fluticasone propionate (FLONASE NA) by Nasal route.  fish oil-dha-epa (FISH OIL) 1,200-144-216 mg Cap Take 1 Cap by mouth daily. No current facility-administered medications for this visit. I have reviewed the nurses notes, vitals, problem list, allergy list, medical history, family, social history and medications. REVIEW OF SYMPTOMS      General: Pt denies excessive weight gain or loss. Pt is able to conduct ADL's  HEENT: Denies blurred vision, headaches, hearing loss, epistaxis and difficulty swallowing. Respiratory: Denies cough, congestion, shortness of breath, RAMOS, wheezing or stridor. Cardiovascular: Denies precordial pain, palpitations, edema or PND  Gastrointestinal: Denies poor appetite, indigestion, abdominal pain or blood in stool  Genitourinary: Denies hematuria, dysuria, increased urinary frequency  Musculoskeletal: Denies joint pain or swelling from muscles or joints  Neurologic: Denies tremor, paresthesias, headache, or sensory motor disturbance  Psychiatric: Denies confusion, insomnia, depression  Integumentray: Denies rash, itching or ulcers. Hematologic: Denies easy bruising, bleeding       PHYSICAL EXAMINATION      There were no vitals filed for this visit. General: Well developed, in no acute distress.   HEENT: No jaundice, oral mucosa moist, no oral ulcers  Neck: Supple, no stiffness, no lymphadenopathy, supple  Heart:  Normal S1/S2 negative S3 or S4. Regular, no murmur, gallop or rub, no jugular venous distention  Respiratory: Clear bilaterally x 4, no wheezing or rales  Abdomen:   Soft, non-tender, bowel sounds are active.   Extremities:  No edema, normal cap refill, no cyanosis. Musculoskeletal: No clubbing, no deformities  Neuro: A&Ox3, speech clear, gait stable, cooperative, no focal neurologic deficits  Skin: Skin color is normal. No rashes or lesions. Non diaphoretic, moist.  Vascular: 2+ pulses symmetric in all extremities       DIAGNOSTIC DATA      EKG:        LABORATORY DATA      Lab Results   Component Value Date/Time    WBC 11.6 (H) 01/05/2019 07:15 PM    Hemoglobin (POC) 12.9 04/23/2016 03:33 PM    HGB 14.0 01/05/2019 07:15 PM    Hematocrit (POC) 38 04/23/2016 03:33 PM    HCT 42.1 01/05/2019 07:15 PM    PLATELET 927 24/95/2898 07:15 PM    MCV 91.9 01/05/2019 07:15 PM      Lab Results   Component Value Date/Time    Sodium 141 01/05/2019 07:15 PM    Potassium 3.4 (L) 01/05/2019 07:15 PM    Chloride 104 01/05/2019 07:15 PM    CO2 25 01/05/2019 07:15 PM    Anion gap 12 01/05/2019 07:15 PM    Glucose 109 (H) 01/05/2019 07:15 PM    BUN 17 01/05/2019 07:15 PM    Creatinine 0.92 01/05/2019 07:15 PM    BUN/Creatinine ratio 18 01/05/2019 07:15 PM    GFR est AA >60 01/05/2019 07:15 PM    GFR est non-AA >60 01/05/2019 07:15 PM    Calcium 9.8 01/05/2019 07:15 PM    Bilirubin, total 0.5 01/05/2019 07:15 PM    AST (SGOT) 18 01/05/2019 07:15 PM    Alk. phosphatase 48 01/05/2019 07:15 PM    Protein, total 7.8 01/05/2019 07:15 PM    Albumin 4.1 01/05/2019 07:15 PM    Globulin 3.7 01/05/2019 07:15 PM    A-G Ratio 1.1 01/05/2019 07:15 PM    ALT (SGPT) 29 01/05/2019 07:15 PM           ASSESSMENT      1. Palpitations  2. Premature atrial contractions  3. Premature ventricular contractions  4.   Chest wall discomfort       PLAN Recommended consideration of an Alive[employer] to monitor for her clinical episodes as well as to monitor during her episodes of palpitations occurring when awakened from sleep. Doubt POTS but discussed vitamin B supplementation as a trial.        FOLLOW-UP     1 year      Thank you, Tla Ngo MD for allowing me to participate in the care of this extraordinarily pleasant female. Please do not hesitate to contact me for further questions/concerns.          Kristie Singletary MD  Cardiac Electrophysiology / Cardiology    [address]  [address] [facility], [facility], [address] StreetJustinD[facility]    [address] [facility]  [phone] / [phone] Fax   [phone] / [phone] Fax

## 2019-03-04 NOTE — PROGRESS NOTES
Room # 7    Palpitations a couple of times of day, one episode after returning monitor of waking up with heart racing    Visit Vitals  /72 (BP 1 Location: Left arm, BP Patient Position: Sitting)   Pulse 72   Resp 16   Ht 5' 8\" (1.727 m)   Wt 149 lb 6.4 oz (67.8 kg)   SpO2 98%   BMI 22.72 kg/m²

## 2019-03-27 ENCOUNTER — OFFICE VISIT (OUTPATIENT)
Dept: NEUROLOGY | Age: 39
End: 2019-03-27

## 2019-03-27 VITALS
RESPIRATION RATE: 16 BRPM | DIASTOLIC BLOOD PRESSURE: 60 MMHG | BODY MASS INDEX: 22.87 KG/M2 | HEART RATE: 70 BPM | WEIGHT: 150.9 LBS | SYSTOLIC BLOOD PRESSURE: 112 MMHG | OXYGEN SATURATION: 93 % | HEIGHT: 68 IN

## 2019-03-27 DIAGNOSIS — H53.9 VISUAL CHANGES: Primary | ICD-10-CM

## 2019-03-27 NOTE — PATIENT INSTRUCTIONS
[address] [facility]   Statement to Patients  April 1, 2014      In an effort to ensure the large volume of patient prescription refills is processed in the most efficient and expeditious manner, we are asking our patients to assist us by calling your Pharmacy for all prescription refills, this will include also your  Mail Order Pharmacy. The pharmacy will contact our office electronically to continue the refill process. Please do not wait until the last minute to call your pharmacy. We need at least 48 hours (2days) to fill prescriptions. We also encourage you to call your pharmacy before going to  your prescription to make sure it is ready. With regard to controlled substance prescription refill requests (narcotic refills) that need to be picked up at our office, we ask your cooperation by providing us with at least 72 hours (3days) notice that you will need a refill. We will not refill narcotic prescription refill requests after 4:00pm on any weekday, Monday through Thursday, or after 2:00pm on Fridays, or on the weekends. We encourage everyone to explore another way of getting your prescription refill request processed using TRUECar, our patient web portal through our electronic medical record system. TRUECar is an efficient and effective way to communicate your medication request directly to the office and  downloadable as an jorge on your smart phone . [employer]ar also features a review functionality that allows you to view your medication list as well as leave messages for your physician. Are you ready to get connected? If so please review the attatched instructions or speak to any of our staff to get you set up right away! Thank you so much for your cooperation. Should you have any questions please contact our Practice Administrator.     The Physicians and Staff,  [facility] [address] [address]  What is a living will?    A living will is a legal form you use to write down the kind of care you want at the end of your life. It is used by the health professionals who will treat you if you aren't able to decide for yourself. If you put your wishes in writing, your loved ones and others will know what kind of care you want. They won't need to guess. This can ease your mind and be helpful to others. A living will is not the same as an estate or property will. An estate will explains what you want to happen with your money and property after you die. Is a living will a legal document? A living will is a legal document. Each state has its own laws about living rivera. If you move to another state, make sure that your living will is legal in the state where you now live. Or you might use a universal form that has been approved by many states. This kind of form can sometimes be completed and stored online. Your electronic copy will then be available wherever you have a connection to the Internet. In most cases, doctors will respect your wishes even if you have a form from a different state. · You don't need an  to complete a living will. But legal advice can be helpful if your state's laws are unclear, your health history is complicated, or your family can't agree on what should be in your living will. · You can change your living will at any time. Some people find that their wishes about end-of-life care change as their health changes. · In addition to making a living will, think about completing a medical power of  form. This form lets you name the person you want to make end-of-life treatment decisions for you (your \"health care agent\") if you're not able to. Many hospitals and nursing homes will give you the forms you need to complete a living will and a medical power of . · Your living will is used only if you can't make or communicate decisions for yourself anymore.  If you become able to make decisions again, you can accept or refuse any treatment, no matter what you wrote in your living will. · Your state may offer an online registry. This is a place where you can store your living will online so the doctors and nurses who need to treat you can find it right away. What should you think about when creating a living will? Talk about your end-of-life wishes with your family members and your doctor. Let them know what you want. That way the people making decisions for you won't be surprised by your choices. Think about these questions as you make your living will:  · Do you know enough about life support methods that might be used? If not, talk to your doctor so you know what might be done if you can't breathe on your own, your heart stops, or you're unable to swallow. · What things would you still want to be able to do after you receive life-support methods? Would you want to be able to walk? To speak? To eat on your own? To live without the help of machines? · If you have a choice, where do you want to be cared for? In your home? At a hospital or nursing home? · Do you want certain Jew practices performed if you become very ill? · If you have a choice at the end of your life, where would you prefer to die? At home? In a hospital or nursing home? Somewhere else? · Would you prefer to be buried or cremated? · Do you want your organs to be donated after you die? What should you do with your living will? · Make sure that your family members and your health care agent have copies of your living will. · Give your doctor a copy of your living will to keep in your medical record. If you have more than one doctor, make sure that each one has a copy. · You may want to put a copy of your living will where it can be easily found. Where can you learn more? Go to http://loni-jemma.info/. Enter U451 in the search box to learn more about \"Learning About Living Kimberly. \"  Current as of: April 18, 2018  Content Version: 11.9  © 6465-5723 [employer]. Care instructions adapted under license by [employer] (which disclaims liability or warranty for this information). If you have questions about a medical condition or this instruction, always ask your healthcare professional. [address] any warranty or liability for your use of this information. At this point time patient is doing well. Will continue to follow-up with Dr. Ismael Nicholas ophthalmology and would be careful about any analogs of vitamin A that could be building up in her system and inducing visual effects. Exam today looks great and I will suggest a revisit as needed.

## 2019-03-27 NOTE — LETTER
3/29/19 Patient: Annia Bull YOB: 1980 Date of Visit: 3/27/2019 Guero Walker MD 
[address] [facility] [place] [facility] [address] VIA Facsimile: [phone] Dear Guero Walker MD, Thank you for referring Ms. Moreno Rahman to [address] [place] [address] for evaluation. My notes for this consultation are attached. If you have questions, please do not hesitate to call me. I look forward to following your patient along with you.  
 
 
Sincerely, 
 
Aniket Rincon MD

## 2019-03-27 NOTE — PROGRESS NOTES
Visual symptoms. Neurology Consult      Subjective:      Jessi Landry is a 41 y.o. female Who comes in today with previously referenced testing for concerns of intracranial hypertension. That workup was unrevealing and she is asymptomatic today. Cautioned her about using vitamin A analogs such as retinoic acid and similar agents that could enhance intracranial pressure. Has acquired a new eye doctor Dr. Marcy Rey. Has no complaints today and if I do not hear from her I will assume that she is doing well. Good luck. Current Outpatient Medications   Medication Sig Dispense Refill    B.infantis-B.ani-B.long-B.bifi (PROBIOTIC 4X) 10-15 mg TbEC Take  by mouth.  fluticasone propionate (FLONASE NA) by Nasal route as needed.  fish oil-dha-epa (FISH OIL) 1,200-144-216 mg Cap Take 1 Cap by mouth daily. Allergies   Allergen Reactions    Grapefruit Swelling     Throat swelling     Past Medical History:   Diagnosis Date    Abnormal Pap smear     2003 had leep    Headache     Phlebitis and thrombophlebitis of unspecified site     pubic area and lower leg varicosities    Tinnitus       Past Surgical History:   Procedure Laterality Date    HX DILATION AND CURETTAGE      HX HERNIA REPAIR  child    umbilical    HX LEEP PROCEDURE      HX WISDOM TEETH EXTRACTION        Social History     Socioeconomic History    Marital status:      Spouse name: Not on file    Number of children: Not on file    Years of education: Not on file    Highest education level: Not on file   Occupational History    Not on file   Social Needs    Financial resource strain: Not on file    Food insecurity:     Worry: Not on file     Inability: Not on file    Transportation needs:     Medical: Not on file     Non-medical: Not on file   Tobacco Use    Smoking status: Never Smoker    Smokeless tobacco: Never Used   Substance and Sexual Activity    Alcohol use:  Yes     Alcohol/week: 0.6 oz     Types: 1 Glasses of wine per week    Drug use: No    Sexual activity: Yes     Partners: Male     Birth control/protection: Surgical     Comment: Vasectomy   Lifestyle    Physical activity:     Days per week: Not on file     Minutes per session: Not on file    Stress: Not on file   Relationships    Social connections:     Talks on phone: Not on file     Gets together: Not on file     Attends Rastafari service: Not on file     Active member of club or organization: Not on file     Attends meetings of clubs or organizations: Not on file     Relationship status: Not on file    Intimate partner violence:     Fear of current or ex partner: Not on file     Emotionally abused: Not on file     Physically abused: Not on file     Forced sexual activity: Not on file   Other Topics Concern    Not on file   Social History Narrative    Not on file      Family History   Problem Relation Age of Onset    Asthma Father     Heart Disease Father     Hypertension Father     Asthma Brother     Cancer Maternal Grandmother     Cancer Maternal Grandfather     Stroke Paternal Grandmother     Seizures Paternal Grandfather       Visit Vitals  /60   Pulse 70   Resp 16   Ht 5' 8\" (1.727 m)   Wt 68.4 kg (150 lb 14.4 oz)   LMP 03/13/2019 (Approximate)   SpO2 93%   BMI 22.94 kg/m²        Review of Systems:   A comprehensive review of systems was negative except for that written in the HPI. Neuro Exam:     Appearance: The patient is well developed, well nourished, provides a coherent history and is in no acute distress. Mental Status: Oriented to time, place and person. Mood and affect appropriate. Cranial Nerves:   Intact visual fields. Fundi are benign with SVP. BEATRIS, EOM's full, no nystagmus, no ptosis. Facial sensation is normal. Corneal reflexes are intact. Facial movement is symmetric. Hearing is normal bilaterally. Palate is midline with normal sternocleidomastoid and trapezius muscles are normal. Tongue is midline.    Motor:  5/5 strength in upper and lower proximal and distal muscles. Normal bulk and tone. No fasciculations. Reflexes:   Deep tendon reflexes 2+/4 and symmetrical.   Sensory:   Normal to touch, pinprick and vibration. Gait:  Normal gait. Tremor:   No tremor noted. Cerebellar:  No cerebellar signs present. Neurovascular:  Normal heart sounds and regular rhythm, peripheral pulses intact, and no carotid bruits. Assessment:   Visual changes. Patient doing well at this point and I see no reason for additional testing and she has acquired a regular eye doctor Dr. Jim Carpenter with [facility]. Asked her to be cautious about vitamin A analogs such as retinoic acid and she should do well. Exam looks normal today. She will be seen as needed. Plan:   Revisit as needed.   Signed by :  Ryan Griffiths MD

## 2020-03-06 ENCOUNTER — TELEPHONE (OUTPATIENT)
Dept: CARDIOLOGY CLINIC | Age: 40
End: 2020-03-06

## 2020-03-23 ENCOUNTER — TELEPHONE (OUTPATIENT)
Dept: CARDIOLOGY CLINIC | Age: 40
End: 2020-03-23

## 2020-08-27 ENCOUNTER — OFFICE VISIT (OUTPATIENT)
Dept: OBGYN CLINIC | Age: 40
End: 2020-08-27
Payer: COMMERCIAL

## 2020-08-27 VITALS
DIASTOLIC BLOOD PRESSURE: 72 MMHG | BODY MASS INDEX: 23.34 KG/M2 | WEIGHT: 154 LBS | HEIGHT: 68 IN | SYSTOLIC BLOOD PRESSURE: 116 MMHG

## 2020-08-27 DIAGNOSIS — Z01.419 ENCOUNTER FOR GYNECOLOGICAL EXAMINATION WITHOUT ABNORMAL FINDING: Primary | ICD-10-CM

## 2020-08-27 PROCEDURE — 99395 PREV VISIT EST AGE 18-39: CPT | Performed by: OBSTETRICS & GYNECOLOGY

## 2020-08-27 NOTE — PATIENT INSTRUCTIONS
Pelvic Exam: Care Instructions Your Care Instructions When your doctor examines all of your pelvic organs, it's called a pelvic exam. Two good reasons to have this kind of exam are to check for sexually transmitted infections (STIs) and to get a Pap test. A Pap test is also called a Pap smear. It checks for early changes that can lead to cancer of the cervix. Sometimes a pelvic exam is part of a regular checkup. Your doctor may ask you to avoid vaginal sex, tampons, vaginal medicines, vaginal sprays or powders, and douching for 1 to 2 days before the test. 
Other times, women have this kind of exam at any time of the month. This is because they have pelvic pain, bleeding, or discharge. Or they may have another pelvic problem. Before your exam, it's important to share some information with your doctor. For example, if you are a survivor of rape or sexual abuse, you can talk about any concerns you may have. Your doctor will also want to know if you are pregnant or use birth control. And he or she will want to hear about any problems, surgeries, or procedures you have had in your pelvic area. You will also need to tell your doctor when your last period was. Follow-up care is a key part of your treatment and safety. Be sure to make and go to all appointments, and call your doctor if you are having problems. It's also a good idea to know your test results and keep a list of the medicines you take. How is a pelvic exam done? · During a pelvic exam, you will: ? Take off your clothes below the waist. You will get a paper or cloth cover to put over the lower half of your body. If this is regular checkup, you may undress completely and put on a gown. ? Lie on your back on an exam table. Your feet will be raised above you. Stirrups will support your feet. · The doctor will: ? Ask you to relax your knees. Your knees need to lean out, toward the walls. ? Check the opening of your vagina for sores or swelling. ? Gently put a tool called a speculum into your vagina. It opens the vagina a little bit. You will feel some pressure. But if you are relaxed, it will not hurt. It lets your doctor see inside the vagina. ? Use a small brush, spatula, or swab to get a sample of cells, if you are having a Pap test or culture. The doctor then removes the speculum. ? Put on gloves and put one or two fingers of one hand into your vagina. The other hand goes on your lower belly. This lets your doctor feel your pelvic organs. You will probably feel some pressure. Try to stay relaxed. ? Put one gloved finger into your rectum and one into your vagina, if needed. This can also help check your pelvic organs. This exam takes about 10 minutes. At the end, you will get a washcloth or tissue to clean your vaginal area. You can then get dressed. Why is a pelvic exam done? A pelvic exam may be done: · As part of a woman's regular physical checkup. The exam may include a Pap test. 
· To check for vaginal infection. · To check for sexually transmitted infections, such as chlamydia or herpes. · To help find the cause of abnormal uterine bleeding. · To look for problems like uterine fibroids, ovarian cysts, or uterine prolapse. · To find the cause of pelvic or belly pain. · Before inserting an intrauterine device (IUD) for birth control. · To collect evidence if you've been sexually assaulted. What are the risks of a pelvic exam? 
There is a small chance that the doctor will find something on a pelvic exam that would not have caused a problem. This is called overdiagnosis. It could lead to tests or treatment you don't need. When should you call for help? Watch closely for changes in your health, and be sure to contact your doctor if you have any problems. Where can you learn more? Go to http://loni-jemma.info/ Enter V659 in the search box to learn more about \"Pelvic Exam: Care Instructions. \" Current as of: November 8, 2019               Content Version: 12.5 © 8781-7717 [employer], [employer]. Care instructions adapted under license by [employer] (which disclaims liability or warranty for this information). If you have questions about a medical condition or this instruction, always ask your healthcare professional. [place] [address] any warranty or liability for your use of this information.

## 2020-08-27 NOTE — PROGRESS NOTES
Annual exam ages 21-44    Yesika Hein is a ,  45 y.o. female [facility] No LMP recorded. .    She presents for her annual checkup. She is having no significant problems. Son 15, 8, 7 and twins 6; kids at [facility]  With regard to the Gardasil vaccine, she n/a. Menstrual status:    Her periods are moderate in flow. She is using three to five pads or tampons per day, 24 days. She denies dysmenorrhea. She reports no premenstrual symptoms. Contraception:    The current method of family planning is vasectomy    Sexual history:     She  reports being sexually active and has had partner(s) who are Male. She reports using the following method of birth control/protection: Surgical..    Medical conditions:    Since her last annual GYN exam about two years ago, she has not the following changes in her health history: none. Pap and Mammogram History:    Her most recent Pap smear was normal, obtained 2 year(s) ago. Breast Cancer History/Substance Abuse: negative    Past Medical History:   Diagnosis Date    Abnormal Pap smear      had leep    Headache     Phlebitis and thrombophlebitis of unspecified site     pubic area and lower leg varicosities    Tinnitus      Past Surgical History:   Procedure Laterality Date    HX DILATION AND CURETTAGE      HX HERNIA REPAIR  child    umbilical    HX LEEP PROCEDURE      HX WISDOM TEETH EXTRACTION         Current Outpatient Medications   Medication Sig Dispense Refill    B.infantis-B.ani-B.long-B.bifi (PROBIOTIC 4X) 10-15 mg TbEC Take  by mouth.  fluticasone propionate (FLONASE NA) by Nasal route as needed.  fish oil-dha-epa (FISH OIL) 1,200-144-216 mg Cap Take 1 Cap by mouth daily. Allergies: Grapefruit     Tobacco History:  reports that she has never smoked. She has never used smokeless tobacco.  Alcohol Abuse:  reports current alcohol use of about 1.0 standard drinks of alcohol per week.   Drug Abuse:  reports no history of drug use. Family Medical/Cancer History:   Family History   Problem Relation Age of Onset    Asthma Father     Heart Disease Father     Hypertension Father     Asthma Brother     Cancer Maternal Grandmother     Cancer Maternal Grandfather     Stroke Paternal Grandmother     Seizures Paternal Grandfather         Review of Systems - History obtained from the patient  Constitutional: negative for weight loss, fever, night sweats  HEENT: negative for hearing loss, earache, congestion, snoring, sorethroat  CV: negative for chest pain, palpitations, edema  Resp: negative for cough, shortness of breath, wheezing  GI: negative for change in bowel habits, abdominal pain, black or bloody stools  : negative for frequency, dysuria, hematuria, vaginal discharge  MSK: negative for back pain, joint pain, muscle pain  Breast: negative for breast lumps, nipple discharge, galactorrhea  Skin :negative for itching, rash, hives  Neuro: negative for dizziness, headache, confusion, weakness  Psych: negative for anxiety, depression, change in mood  Heme/lymph: negative for bleeding, bruising, pallor    Physical Exam    There were no vitals taken for this visit.     Constitutional  · Appearance: well-nourished, well developed, alert, in no acute distress    HENT  · Head and Face: appears normal    Chest  · Respiratory Effort: breathing unlabored    Breasts  · Inspection of Breasts: breasts symmetrical, no skin changes, no discharge present, nipple appearance normal, no skin retraction present  · Palpation of Breasts and Axillae: no masses present on palpation, no breast tenderness  · Axillary Lymph Nodes: no lymphadenopathy present    Gastrointestinal  · Abdominal Examination: abdomen non-tender to palpation, normal bowel sounds, no masses present  · Liver and spleen: no hepatomegaly present, spleen not palpable  · Hernias: no hernias identified    Genitourinary  · External Genitalia: normal appearance for age, no discharge present, no tenderness present, no inflammatory lesions present, no masses present, no atrophy present  · Vagina: normal vaginal vault without central or paravaginal defects, bloody discharge present, no inflammatory lesions present, no masses present  · Bladder: non-tender to palpation  · Urethra: appears normal  · Cervix: normal   · Uterus: normal size, shape and consistency  · Adnexa: no adnexal tenderness present, no adnexal masses present  · Perineum: perineum within normal limits, no evidence of trauma, no rashes or skin lesions present  · Anus: anus within normal limits, no hemorrhoids present  · Inguinal Lymph Nodes: no lymphadenopathy present    Skin  · General Inspection: no rash, no lesions identified    Neurologic/Psychiatric  · Mental Status:  · Orientation: grossly oriented to person, place and time  · Mood and Affect: mood normal, affect appropriate    . Assessment:  Routine gynecologic examination; on menses; negative pap 2018  Her current medical status is satisfactory with no evidence of significant gynecologic issues. Plan:  GC/ chlamydia offered and declined  Patient offered and completed Myriad genetic screening questionnaire    and   changes in personal and family pertinent medical history. Patient declines presence of chaperone during today's visit.    Pap not done today; offered pap in 2wk or next year  Counseled re: diet, exercise, healthy lifestyle  Return for yearly wellness visits  Gardisil counseling provided  Rec screening mammo at either 36 or 41

## 2020-10-02 ENCOUNTER — OFFICE VISIT (OUTPATIENT)
Dept: OBGYN CLINIC | Age: 40
End: 2020-10-02
Payer: COMMERCIAL

## 2020-10-02 VITALS
HEIGHT: 68 IN | WEIGHT: 157.4 LBS | SYSTOLIC BLOOD PRESSURE: 108 MMHG | DIASTOLIC BLOOD PRESSURE: 78 MMHG | BODY MASS INDEX: 23.86 KG/M2

## 2020-10-02 DIAGNOSIS — Z01.419 ENCOUNTER FOR GYNECOLOGICAL EXAMINATION WITHOUT ABNORMAL FINDING: Primary | ICD-10-CM

## 2020-10-02 PROCEDURE — 99395 PREV VISIT EST AGE 18-39: CPT | Performed by: OBSTETRICS & GYNECOLOGY

## 2020-10-02 NOTE — PATIENT INSTRUCTIONS
Pelvic Exam: Care Instructions Your Care Instructions When your doctor examines all of your pelvic organs, it's called a pelvic exam. Two good reasons to have this kind of exam are to check for sexually transmitted infections (STIs) and to get a Pap test. A Pap test is also called a Pap smear. It checks for early changes that can lead to cancer of the cervix. Sometimes a pelvic exam is part of a regular checkup. Your doctor may ask you to avoid vaginal sex, tampons, vaginal medicines, vaginal sprays or powders, and douching for 1 to 2 days before the test. 
Other times, women have this kind of exam at any time of the month. This is because they have pelvic pain, bleeding, or discharge. Or they may have another pelvic problem. Before your exam, it's important to share some information with your doctor. For example, if you are a survivor of rape or sexual abuse, you can talk about any concerns you may have. Your doctor will also want to know if you are pregnant or use birth control. And he or she will want to hear about any problems, surgeries, or procedures you have had in your pelvic area. You will also need to tell your doctor when your last period was. Follow-up care is a key part of your treatment and safety. Be sure to make and go to all appointments, and call your doctor if you are having problems. It's also a good idea to know your test results and keep a list of the medicines you take. How is a pelvic exam done? · During a pelvic exam, you will: ? Take off your clothes below the waist. You will get a paper or cloth cover to put over the lower half of your body. If this is regular checkup, you may undress completely and put on a gown. ? Lie on your back on an exam table. Your feet will be raised above you. Stirrups will support your feet. · The doctor will: ? Ask you to relax your knees. Your knees need to lean out, toward the walls. ? Check the opening of your vagina for sores or swelling. ? Gently put a tool called a speculum into your vagina. It opens the vagina a little bit. You will feel some pressure. But if you are relaxed, it will not hurt. It lets your doctor see inside the vagina. ? Use a small brush, spatula, or swab to get a sample of cells, if you are having a Pap test or culture. The doctor then removes the speculum. ? Put on gloves and put one or two fingers of one hand into your vagina. The other hand goes on your lower belly. This lets your doctor feel your pelvic organs. You will probably feel some pressure. Try to stay relaxed. ? Put one gloved finger into your rectum and one into your vagina, if needed. This can also help check your pelvic organs. This exam takes about 10 minutes. At the end, you will get a washcloth or tissue to clean your vaginal area. You can then get dressed. Why is a pelvic exam done? A pelvic exam may be done: · As part of a woman's regular physical checkup. The exam may include a Pap test. 
· To check for vaginal infection. · To check for sexually transmitted infections, such as chlamydia or herpes. · To help find the cause of abnormal uterine bleeding. · To look for problems like uterine fibroids, ovarian cysts, or uterine prolapse. · To find the cause of pelvic or belly pain. · Before inserting an intrauterine device (IUD) for birth control. · To collect evidence if you've been sexually assaulted. What are the risks of a pelvic exam? 
There is a small chance that the doctor will find something on a pelvic exam that would not have caused a problem. This is called overdiagnosis. It could lead to tests or treatment you don't need. When should you call for help? Watch closely for changes in your health, and be sure to contact your doctor if you have any problems. Where can you learn more? Go to http://www.[employer].com/ Enter W387 in the search box to learn more about \"Pelvic Exam: Care Instructions. \" Current as of: November 8, 2019               Content Version: 12.6 © 4653-7981 [employer]. Care instructions adapted under license by [employer] (which disclaims liability or warranty for this information). If you have questions about a medical condition or this instruction, always ask your healthcare professional. [place] [address] any warranty or liability for your use of this information.

## 2020-10-02 NOTE — PROGRESS NOTES
Annual exam ages 40-58    [address] is a ,  45 y.o. female [facility] No LMP recorded. .    She presents for her annual checkup. She is having no significant problems. Kids 12, 10, 8 and twins now 6. Kids back at school       Menstrual status:    Her periods are moderate in flow. She is using one to two pads or tampons per day, usually regular and last 26-30 days. She denies dysmenorrhea. She reports no premenstrual symptoms. Contraception:    The current method of family planning is vasectomy. Sexual history:    She  reports being sexually active and has had partner(s) who are Male. She reports using the following method of birth control/protection: Surgical.    Medical conditions:    Since her last annual GYN exam about one year ago, she has not the following changes in her health history: none. Pap and Mammogram History:    Her most recent Pap smear was normal, obtained 1 year(s) ago. The patient has never had a mammogram.    Breast Cancer History/Substance Abuse: negative    Osteoporosis History:    Family history does not include a first or second degree relative with osteopenia or osteoporosis. Past Medical History:   Diagnosis Date    Abnormal Pap smear      had leep    Headache     Phlebitis and thrombophlebitis of unspecified site     pubic area and lower leg varicosities    Tinnitus      Past Surgical History:   Procedure Laterality Date    HX DILATION AND CURETTAGE      HX HERNIA REPAIR  child    umbilical    HX LEEP PROCEDURE      HX WISDOM TEETH EXTRACTION         Current Outpatient Medications   Medication Sig Dispense Refill    B.infantis-B.ani-B.long-B.bifi (PROBIOTIC 4X) 10-15 mg TbEC Take  by mouth.  fish oil-dha-epa (FISH OIL) 1,200-144-216 mg Cap Take 1 Cap by mouth daily. Allergies: Grapefruit     Tobacco History:  reports that she has never smoked.  She has never used smokeless tobacco.  Alcohol Abuse:  reports current alcohol use of about 1.0 standard drinks of alcohol per week. Drug Abuse:  reports no history of drug use. Family Medical/Cancer History:   Family History   Problem Relation Age of Onset    Asthma Father     Heart Disease Father     Hypertension Father     Asthma Brother     Cancer Maternal Grandmother     Cancer Maternal Grandfather     Stroke Paternal Grandmother     Seizures Paternal Grandfather         Review of Systems - History obtained from the patient  Constitutional: negative for weight loss, fever, night sweats  HEENT: negative for hearing loss, earache, congestion, snoring, sorethroat  CV: negative for chest pain, palpitations, edema  Resp: negative for cough, shortness of breath, wheezing  GI: negative for change in bowel habits, abdominal pain, black or bloody stools  : negative for frequency, dysuria, hematuria, vaginal discharge  MSK: negative for back pain, joint pain, muscle pain  Breast: negative for breast lumps, nipple discharge, galactorrhea  Skin :negative for itching, rash, hives  Neuro: negative for dizziness, headache, confusion, weakness  Psych: negative for anxiety, depression, change in mood  Heme/lymph: negative for bleeding, bruising, pallor    Physical Exam    There were no vitals taken for this visit.     Constitutional  · Appearance: well-nourished, well developed, alert, in no acute distress    HENT  · Head and Face: appears normal    Chest  · Respiratory Effort: breathing unlabored      Breasts  · Inspection of Breasts: breasts symmetrical, no skin changes, no discharge present, nipple appearance normal, no skin retraction present  · Palpation of Breasts and Axillae: no masses present on palpation, no breast tenderness  · Axillary Lymph Nodes: no lymphadenopathy present    Gastrointestinal  · Abdominal Examination: abdomen non-tender to palpation, normal bowel sounds, no masses present  · Liver and spleen: no hepatomegaly present, spleen not palpable  · Hernias: no hernias identified    Skin  · General Inspection: no rash, no lesions identified    Neurologic/Psychiatric  · Mental Status:  · Orientation: grossly oriented to person, place and time  · Mood and Affect: mood normal, affect appropriate    Genitourinary  · External Genitalia: normal appearance for age, no discharge present, no tenderness present, no inflammatory lesions present, no masses present, no atrophy present  · Vagina: normal vaginal vault without central or paravaginal defects, no discharge present, no inflammatory lesions present, no masses present  · Bladder: non-tender to palpation  · Urethra: appears normal  · Cervix: normal   · Uterus: normal size, shape and consistency  · Adnexa: no adnexal tenderness present, no adnexal masses present  · Perineum: perineum within normal limits, no evidence of trauma, no rashes or skin lesions present  · Anus: anus within normal limits, no hemorrhoids present  · Inguinal Lymph Nodes: no lymphadenopathy present    Assessment:  Routine gynecologic examination  Her current medical status is satisfactory with no evidence of significant gynecologic issues. Plan:  Pap done today  Patient offered and completed Myriad genetic screening questionnaire and  no changes in personal and family pertinent medical history. Patient declines presence of chaperone during today's visit.    Counseled re: diet, exercise, healthy lifestyle  Return for yearly wellness visits  Rec annual mammogram

## 2020-10-17 LAB
CYTOLOGIST CVX/VAG CYTO: ABNORMAL
CYTOLOGY CVX/VAG DOC CYTO: ABNORMAL
CYTOLOGY CVX/VAG DOC THIN PREP: ABNORMAL
DX ICD CODE: ABNORMAL
DX ICD CODE: ABNORMAL
HPV I/H RISK 4 DNA CVX QL PROBE+SIG AMP: NEGATIVE
LABCORP, 190119: ABNORMAL
Lab: ABNORMAL
OTHER STN SPEC: ABNORMAL
PATHOLOGIST CVX/VAG CYTO: ABNORMAL
STAT OF ADQ CVX/VAG CYTO-IMP: ABNORMAL

## 2022-02-04 ENCOUNTER — OFFICE VISIT (OUTPATIENT)
Dept: OBGYN CLINIC | Age: 42
End: 2022-02-04
Payer: COMMERCIAL

## 2022-02-04 VITALS — SYSTOLIC BLOOD PRESSURE: 112 MMHG | DIASTOLIC BLOOD PRESSURE: 66 MMHG | WEIGHT: 168 LBS | BODY MASS INDEX: 25.54 KG/M2

## 2022-02-04 DIAGNOSIS — Z12.31 BREAST CANCER SCREENING BY MAMMOGRAM: ICD-10-CM

## 2022-02-04 DIAGNOSIS — Z01.419 WELL WOMAN EXAM WITH ROUTINE GYNECOLOGICAL EXAM: Primary | ICD-10-CM

## 2022-02-04 PROCEDURE — 99396 PREV VISIT EST AGE 40-64: CPT | Performed by: OBSTETRICS & GYNECOLOGY

## 2022-02-04 RX ORDER — BUSPIRONE HYDROCHLORIDE 7.5 MG/1
7.5 TABLET ORAL 2 TIMES DAILY
COMMUNITY

## 2022-02-04 NOTE — PATIENT INSTRUCTIONS
Well Visit, Ages 25 to 48: Care Instructions  Overview     Well visits can help you stay healthy. Your doctor has checked your overall health and may have suggested ways to take good care of yourself. Your doctor also may have recommended tests. At home, you can help prevent illness with healthy eating, regular exercise, and other steps. Follow-up care is a key part of your treatment and safety. Be sure to make and go to all appointments, and call your doctor if you are having problems. It's also a good idea to know your test results and keep a list of the medicines you take. How can you care for yourself at home? · Get screening tests that you and your doctor decide on. Screening helps find diseases before any symptoms appear. · Eat healthy foods. Choose fruits, vegetables, whole grains, protein, and low-fat dairy foods. Limit fat, especially saturated fat. Reduce salt in your diet. · Limit alcohol. If you are a man, have no more than 2 drinks a day or 14 drinks a week. If you are a woman, have no more than 1 drink a day or 7 drinks a week. · Get at least 30 minutes of physical activity on most days of the week. Walking is a good choice. You also may want to do other activities, such as running, swimming, cycling, or playing tennis or team sports. Discuss any changes in your exercise program with your doctor. · Reach and stay at a healthy weight. This will lower your risk for many problems, such as obesity, diabetes, heart disease, and high blood pressure. · Do not smoke or allow others to smoke around you. If you need help quitting, talk to your doctor about stop-smoking programs and medicines. These can increase your chances of quitting for good. · Care for your mental health. It is easy to get weighed down by worry and stress. Learn strategies to manage stress, like deep breathing and mindfulness, and stay connected with your family and community.  If you find you often feel sad or hopeless, talk with your doctor. Treatment can help. · Talk to your doctor about whether you have any risk factors for sexually transmitted infections (STIs). You can help prevent STIs if you wait to have sex with a new partner (or partners) until you've each been tested for STIs. It also helps if you use condoms (male or female condoms) and if you limit your sex partners to one person who only has sex with you. Vaccines are available for some STIs, such as HPV. · Use birth control if it's important to you to prevent pregnancy. Talk with your doctor about the choices available and what might be best for you. · If you think you may have a problem with alcohol or drug use, talk to your doctor. This includes prescription medicines (such as amphetamines and opioids) and illegal drugs (such as cocaine and methamphetamine). Your doctor can help you figure out what type of treatment is best for you. · Protect your skin from too much sun. When you're outdoors from 10 a.m. to 4 p.m., stay in the shade or cover up with clothing and a hat with a wide brim. Wear sunglasses that block UV rays. Even when it's cloudy, put broad-spectrum sunscreen (SPF 30 or higher) on any exposed skin. · See a dentist one or two times a year for checkups and to have your teeth cleaned. · Wear a seat belt in the car. When should you call for help? Watch closely for changes in your health, and be sure to contact your doctor if you have any problems or symptoms that concern you. Where can you learn more? Go to http://www.[employer].com/  Enter P072 in the search box to learn more about \"Well Visit, Ages 25 to 48: Care Instructions. \"  Current as of: February 11, 2021               Content Version: 13.0  © 7410-6665 [employer]. Care instructions adapted under license by [employer] (which disclaims liability or warranty for this information).  If you have questions about a medical condition or this instruction, always ask your healthcare professional. [place] [address] any warranty or liability for your use of this information.

## 2022-02-04 NOTE — PROGRESS NOTES
Annual exam ages 40-58    [address] is a G5 ,  37 y.o. female [address] [facility],[address] Patient's last menstrual period was 01/11/2022. .    She presents for her annual checkup. She is having no significant problems. Kids 13, 11, 9 and twins 7         Menstrual status:    Her periods are light in flow. She is using three to five pads or tampons per day, usually regular and last 26-30 days. She denies dysmenorrhea. She reports no premenstrual symptoms. Contraception:    The current method of family planning is vasectomy. Sexual history:    She  reports being sexually active and has had partner(s) who are male. She reports using the following method of birth control/protection: Surgical.    Medical conditions:    Since her last annual GYN exam about one year ago, she has not the following changes in her health history: none. Pap and Mammogram History:    Her most recent Pap smear was normal, obtained 1 year(s) ago. The patient has not had a recent mammogram.    Breast Cancer History/Substance Abuse: negative    Osteoporosis History:    Family history does not include a first or second degree relative with osteopenia or osteoporosis. Past Medical History:   Diagnosis Date    Abnormal Pap smear     2003 had leep    Headache     Phlebitis and thrombophlebitis of unspecified site     pubic area and lower leg varicosities    Tinnitus      Past Surgical History:   Procedure Laterality Date    HX DILATION AND CURETTAGE      HX HERNIA REPAIR  child    umbilical    HX LEEP PROCEDURE      HX WISDOM TEETH EXTRACTION         Current Outpatient Medications   Medication Sig Dispense Refill    busPIRone (BUSPAR) 7.5 mg tablet Take 7.5 mg by mouth two (2) times a day.  B.infantis-B.ani-B.long-B.bifi (PROBIOTIC 4X) 10-15 mg TbEC Take  by mouth.  fish oil-dha-epa (FISH OIL) 1,200-144-216 mg Cap Take 1 Cap by mouth daily.        Allergies: Grapefruit     Tobacco History:  reports that she has never smoked. She has never used smokeless tobacco.  Alcohol Abuse:  reports current alcohol use of about 1.0 standard drink of alcohol per week. Drug Abuse:  reports no history of drug use.     Family Medical/Cancer History:   Family History   Problem Relation Age of Onset    Asthma Father     Heart Disease Father     Hypertension Father     Asthma Brother     Cancer Maternal Grandmother     Cancer Maternal Grandfather     Stroke Paternal Grandmother     Seizures Paternal Grandfather         Review of Systems - History obtained from the patient  Constitutional: negative for weight loss, fever, night sweats  HEENT: negative for hearing loss, earache, congestion, snoring, sorethroat  CV: negative for chest pain, palpitations, edema  Resp: negative for cough, shortness of breath, wheezing  GI: negative for change in bowel habits, abdominal pain, black or bloody stools  : negative for frequency, dysuria, hematuria, vaginal discharge  MSK: negative for back pain, joint pain, muscle pain  Breast: negative for breast lumps, nipple discharge, galactorrhea  Skin :negative for itching, rash, hives  Neuro: negative for dizziness, headache, confusion, weakness  Psych: negative for anxiety, depression, change in mood  Heme/lymph: negative for bleeding, bruising, pallor    Physical Exam    Visit Vitals  /66   Wt 168 lb (76.2 kg)   LMP 01/11/2022   BMI 25.54 kg/m²       Constitutional  · Appearance: well-nourished, well developed, alert, in no acute distress    HENT  · Head and Face: appears normal    Chest  · Respiratory Effort: breathing unlabored      Breasts  · Inspection of Breasts: breasts symmetrical, no skin changes, no discharge present, nipple appearance normal, no skin retraction present  · Palpation of Breasts and Axillae: no masses present on palpation, no breast tenderness  · Axillary Lymph Nodes: no lymphadenopathy present    Gastrointestinal  · Abdominal Examination: abdomen non-tender to palpation, normal bowel sounds, no masses present  · Liver and spleen: no hepatomegaly present, spleen not palpable  · Hernias: no hernias identified    Skin  · General Inspection: no rash, no lesions identified    Neurologic/Psychiatric  · Mental Status:  · Orientation: grossly oriented to person, place and time  · Mood and Affect: mood normal, affect appropriate    Genitourinary  · External Genitalia: normal appearance for age, no discharge present, no tenderness present, no inflammatory lesions present, no masses present, no atrophy present  · Vagina: normal vaginal vault without central or paravaginal defects, no discharge present, no inflammatory lesions present, no masses present  · Bladder: non-tender to palpation  · Urethra: appears normal  · Cervix: normal   · Uterus: normal size, shape and consistency  · Adnexa: no adnexal tenderness present, no adnexal masses present  · Perineum: perineum within normal limits, no evidence of trauma, no rashes or skin lesions present  · Anus: anus within normal limits, no hemorrhoids present  · Inguinal Lymph Nodes: no lymphadenopathy present    Assessment:  Routine gynecologic examination  Her current medical status is satisfactory with no evidence of significant gynecologic issues. Plan:  Pap done today     Patient declines presence of chaperone during today's visit.    Counseled re: diet, exercise, healthy lifestyle  Return for yearly wellness visits  Rec annual mammogram

## 2022-02-07 LAB
CYTOLOGIST CVX/VAG CYTO: NORMAL
CYTOLOGY CVX/VAG DOC CYTO: NORMAL
CYTOLOGY CVX/VAG DOC THIN PREP: NORMAL
DX ICD CODE: NORMAL
LABCORP, 190119: NORMAL
Lab: NORMAL
OTHER STN SPEC: NORMAL
STAT OF ADQ CVX/VAG CYTO-IMP: NORMAL

## 2022-02-23 ENCOUNTER — HOSPITAL ENCOUNTER (OUTPATIENT)
Dept: MAMMOGRAPHY | Age: 42
Discharge: HOME | End: 2022-02-23
Attending: OBSTETRICS & GYNECOLOGY
Payer: COMMERCIAL

## 2022-02-23 DIAGNOSIS — Z12.31 BREAST CANCER SCREENING BY MAMMOGRAM: ICD-10-CM

## 2022-02-23 PROCEDURE — 77063 BREAST TOMOSYNTHESIS BI: CPT

## 2022-12-29 ENCOUNTER — TELEPHONE (OUTPATIENT)
Dept: OBGYN CLINIC | Age: 42
End: 2022-12-29

## 2022-12-29 NOTE — TELEPHONE ENCOUNTER
Pt calling name and  verified. pt is calling asking for appointment she has been having breast pain in her left breast for a couple of months she feels a lump now.        Please advise thank you

## 2023-01-04 ENCOUNTER — OFFICE VISIT (OUTPATIENT)
Dept: OBGYN CLINIC | Age: 43
End: 2023-01-04
Payer: COMMERCIAL

## 2023-01-04 VITALS — DIASTOLIC BLOOD PRESSURE: 88 MMHG | WEIGHT: 165 LBS | SYSTOLIC BLOOD PRESSURE: 130 MMHG | BODY MASS INDEX: 25.09 KG/M2

## 2023-01-04 DIAGNOSIS — N64.4 BREAST PAIN: Primary | ICD-10-CM

## 2023-01-04 PROCEDURE — 99213 OFFICE O/P EST LOW 20 MIN: CPT | Performed by: OBSTETRICS & GYNECOLOGY

## 2023-01-04 NOTE — PROGRESS NOTES
Nando Olivares is a 40 y.o. female who complains of  pain in the left breast under nipple. Pain has been there x1 year, however, patient states has increased in pain in the last month.  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  Has had negative mammogram last year. No nipple discharge or palpable mass  Busy, good year. Both her 's parents  over past year  No FH breast cancer    Her current method of family planning is vasectomy. The patient is sexually active. She developed this problem approximately 1 year ago. Her relevant past medical history:   Past Medical History:   Diagnosis Date    Abnormal Pap smear      had leep    Headache     Phlebitis and thrombophlebitis of unspecified site     pubic area and lower leg varicosities    Tinnitus         Past Surgical History:   Procedure Laterality Date    HX DILATION AND CURETTAGE      HX HERNIA REPAIR  child    umbilical    HX LEEP PROCEDURE      HX WISDOM TEETH EXTRACTION       Social History     Occupational History    Not on file   Tobacco Use    Smoking status: Never    Smokeless tobacco: Never   Vaping Use    Vaping Use: Never used   Substance and Sexual Activity    Alcohol use: Yes     Alcohol/week: 1.0 standard drink     Types: 1 Glasses of wine per week    Drug use: No    Sexual activity: Yes     Partners: Male     Birth control/protection: Surgical     Comment: Vasectomy     Family History   Problem Relation Age of Onset    Asthma Father     Heart Disease Father     Hypertension Father     Asthma Brother     Cancer Maternal Grandmother     Cancer Maternal Grandfather     Stroke Paternal Grandmother     Seizures Paternal Grandfather        Allergies   Allergen Reactions    Grapefruit Swelling     Throat swelling     Prior to Admission medications    Medication Sig Start Date End Date Taking? Authorizing Provider   busPIRone (BUSPAR) 7.5 mg tablet Take 7.5 mg by mouth two (2) times a day.    Yes Provider, Historical B.infantis-B.ani-B.long-B.bifi (PROBIOTIC 4X) 10-15 mg TbEC Take  by mouth. Provider, Historical   fish oil-dha-epa (FISH OIL) 1,200-144-216 mg Cap Take 1 Cap by mouth daily.     Provider, Historical        Review of Systems - History obtained from the patient  Constitutional: negative for weight loss, fever, night sweats  HEENT: negative for hearing loss, earache, congestion, snoring, sorethroat  CV: negative for chest pain, palpitations, edema  Resp: negative for cough, shortness of breath, wheezing  Breast: negative for breast lumps, nipple discharge, galactorrhea  GI: negative for change in bowel habits, abdominal pain, black or bloody stools  : negative for frequency, dysuria, hematuria  MSK: negative for back pain, joint pain, muscle pain  Skin: negative for itching, rash, hives  Neuro: negative for dizziness, headache, confusion, weakness  Psych: negative for anxiety, depression, change in mood  Heme/lymph: negative for bleeding, bruising, pallor      Objective:  Visit Vitals  /88   Wt 165 lb (74.8 kg)   BMI 25.09 kg/m²          PHYSICAL EXAMINATION    Constitutional  Appearance: well-nourished, well developed, alert, in no acute distress    HENT  Head and Face: appears normal    Neck  Inspection/Palpation: normal appearance, no masses or tenderness  Lymph Nodes: no lymphadenopathy present  Thyroid: gland size normal, nontender, no nodules or masses present on palpation    Breasts  Inspection of Breasts: breasts symmetrical, no skin changes, no discharge present, nipple appearance normal, no skin retraction present  Palpation of Breasts and Axillae: no masses present on palpation, no breast tenderness  Axillary Lymph Nodes: no lymphadenopathy present    Gastrointestinal  Abdominal Examination: abdomen non-tender to palpation, normal bowel sounds, no masses present  Liver and spleen: no hepatomegaly present, spleen not palpable  Hernias: no hernias identified        Skin  General Inspection: no rash, no lesions identified    Neurologic/Psychiatric  Mental Status:  Orientation: grossly oriented to person, place and time  Mood and Affect: mood normal, affect appropriate    Assessment:    Left nipple/ breast pain    Plan:   Reviewed w pt in detail low risk nature of discomfort but she's tearful and seeking more thorough evaluation  Etiologies for breast discomfort reviewed  US/ surg eval at breast center  Patient declines presence of chaperone during today's visit.

## 2023-01-09 ENCOUNTER — TRANSCRIBE ORDER (OUTPATIENT)
Dept: MAMMOGRAPHY | Age: 43
End: 2023-01-09

## 2023-01-09 DIAGNOSIS — Z12.31 VISIT FOR SCREENING MAMMOGRAM: Primary | ICD-10-CM

## 2023-01-10 DIAGNOSIS — N64.4 BREAST PAIN: Primary | ICD-10-CM

## 2023-01-17 ENCOUNTER — OFFICE VISIT (OUTPATIENT)
Dept: SURGERY | Age: 43
End: 2023-01-17
Payer: COMMERCIAL

## 2023-01-17 DIAGNOSIS — N64.4 BREAST PAIN: ICD-10-CM

## 2023-01-17 DIAGNOSIS — N60.12 FIBROCYSTIC BREAST CHANGES OF BOTH BREASTS: Primary | ICD-10-CM

## 2023-01-17 DIAGNOSIS — N60.11 FIBROCYSTIC BREAST CHANGES OF BOTH BREASTS: Primary | ICD-10-CM

## 2023-01-17 PROCEDURE — 99203 OFFICE O/P NEW LOW 30 MIN: CPT | Performed by: NURSE PRACTITIONER

## 2023-01-17 NOTE — PROGRESS NOTES
HISTORY OF PRESENT ILLNESS  Celestine Caceres is a 40 y.o. female. HPI New patient presents for evaluation of LEFT breast pain x 1 year. Reports that it is more frequent and persistent recently. She notices it when people hug her or the dog paws at her chest.  The discomfort is throughout her LEFT breast and maybe slightly increased in the inferior and lateral breast.  Denies skin changes, breast masses and nipple discharge. No RIGHT breast concerns. Breast history -   Referring - Dr. Anneliese Brar  No history of breast biopsies    Family history -   Maternal grandmother - lung cancer  Maternal grandfather - lung cancer    OB History          7    Para   6    Term   5       1    AB   1    Living   5         SAB   1    IAB        Ectopic        Molar        Multiple   1    Live Births   5          Obstetric Comments   Menarche 16, LMP 24, # of children 5, age of 4st delivery 33, Hysterectomy/oophorectomy no/no, Breast bx no, history of breast feeding yes, BCP yes, Hormone therapy no                 Past Surgical History:   Procedure Laterality Date    HX DILATION AND CURETTAGE      HX HERNIA REPAIR  child    umbilical    HX LEEP PROCEDURE      HX WISDOM TEETH EXTRACTION           GILDA Results (most recent):  Results from Hospital Encounter encounter on 22    GILDA 3D MICKIE W MAMMO BI SCREENING INCL CAD    Narrative  STUDY: Bilateral digital screening mammogram with 3-D tomosynthesis    INDICATION:  Screening. COMPARISON: Baseline study    BREAST COMPOSITION: The breasts are heterogeneously dense, which may obscure  small masses. FINDINGS: Bilateral digital screening mammography was performed and is  interpreted in conjunction with a computer assisted detection (CAD) system. Additionally, tomosynthesis of both breasts in the CC and MLO projections was  performed. No suspicious masses or calcifications are identified. Impression  BI-RADS 1: Negative.  No mammographic evidence of malignancy. RECOMMENDATIONS:  Next screening mammogram is recommended in one year. The patient will be notified of these results. ROS    Physical Exam  Constitutional:       Appearance: Normal appearance. Chest:   Breasts:     Right: No mass, nipple discharge, skin change or tenderness. Left: Tenderness present. No mass, nipple discharge or skin change. Musculoskeletal:      Comments: FROM - UE x 2   Lymphadenopathy:      Upper Body:      Right upper body: No supraclavicular or axillary adenopathy. Left upper body: No supraclavicular or axillary adenopathy. Neurological:      Mental Status: She is alert. Psychiatric:         Attention and Perception: Attention normal.         Mood and Affect: Mood normal.         Speech: Speech normal.         Behavior: Behavior normal.       Visit Vitals  LMP 01/04/2023       ASSESSMENT and PLAN    ICD-10-CM ICD-9-CM    1. Fibrocystic breast changes of both breasts  N60.11 610.1     N60.12        2. Breast pain  N64.4 611.71           Normal exam with no evidence of breast malignancy. Diffuse LEFT breast pain is likely fibrocystic in nature. We reviewed fibrocystic changes to the breast, the associated discomfort and the often benign nature of this process. We also reviewed possible relief through symptom management (supportive bra, heat and/or ice and OTC pain medication PRN and the use of Vitamin E (400 units/day) and primrose oil (500mg/two times a day). Discussed mixed evidence behind the use of Vit E and primrose oil. Handout given. Reports that the discomfort is overall tolerable, but wanted to make sure it was not a sign of something worrisome. Reviewed s/sx of malignancy. Last mammo in 2/2022 - normal.    BSmammogram 3D scheduled for next month. Since no focal pain or abnormal finding on CBE, no US at this time. RTC here PRN. She is comfortable with this plan.   All questions answered and she stated understanding. Total time spent for this patient - 30 minutes.

## 2023-04-22 DIAGNOSIS — Z12.31 VISIT FOR SCREENING MAMMOGRAM: Primary | ICD-10-CM

## 2023-06-06 ENCOUNTER — HOSPITAL ENCOUNTER (OUTPATIENT)
Facility: HOSPITAL | Age: 43
Discharge: HOME | End: 2023-06-09
Payer: COMMERCIAL

## 2023-06-06 DIAGNOSIS — Z12.31 VISIT FOR SCREENING MAMMOGRAM: ICD-10-CM

## 2023-06-06 PROCEDURE — 77063 BREAST TOMOSYNTHESIS BI: CPT

## 2023-10-17 NOTE — PATIENT INSTRUCTIONS
[facility]   Statement to Patients  April 1, 2014      In an effort to ensure the large volume of patient prescription refills is processed in the most efficient and expeditious manner, we are asking our patients to assist us by calling your Pharmacy for all prescription refills, this will include also your  Mail Order Pharmacy. The pharmacy will contact our office electronically to continue the refill process. Please do not wait until the last minute to call your pharmacy. We need at least 48 hours (2days) to fill prescriptions. We also encourage you to call your pharmacy before going to  your prescription to make sure it is ready. With regard to controlled substance prescription refill requests (narcotic refills) that need to be picked up at our office, we ask your cooperation by providing us with at least 72 hours (3days) notice that you will need a refill. We will not refill narcotic prescription refill requests after 4:00pm on any weekday, Monday through Thursday, or after 2:00pm on Fridays, or on the weekends. We encourage everyone to explore another way of getting your prescription refill request processed using [employer], our patient web portal through our electronic medical record system. [employer] is an efficient and effective way to communicate your medication request directly to the office and  downloadable as an jorge on your smart phone . [employer] also features a review functionality that allows you to view your medication list as well as leave messages for your physician. Are you ready to get connected? If so please review the attatched instructions or speak to any of our staff to get you set up right away! Thank you so much for your cooperation. Should you have any questions please contact our Practice Administrator.     The Physicians and Staff,  [facility] [address]  What is a living will?    A living will is a legal form you use to write down the kind of care you want at the end of your life. It is used by the health professionals who will treat you if you aren't able to decide for yourself. If you put your wishes in writing, your loved ones and others will know what kind of care you want. They won't need to guess. This can ease your mind and be helpful to others. A living will is not the same as an estate or property will. An estate will explains what you want to happen with your money and property after you die. Is a living will a legal document? A living will is a legal document. Each state has its own laws about living rivera. If you move to another state, make sure that your living will is legal in the state where you now live. Or you might use a universal form that has been approved by many states. This kind of form can sometimes be completed and stored online. Your electronic copy will then be available wherever you have a connection to the Internet. In most cases, doctors will respect your wishes even if you have a form from a different state. · You don't need an  to complete a living will. But legal advice can be helpful if your state's laws are unclear, your health history is complicated, or your family can't agree on what should be in your living will. · You can change your living will at any time. Some people find that their wishes about end-of-life care change as their health changes. · In addition to making a living will, think about completing a medical power of  form. This form lets you name the person you want to make end-of-life treatment decisions for you (your \"health care agent\") if you're not able to. Many hospitals and nursing homes will give you the forms you need to complete a living will and a medical power of . · Your living will is used only if you can't make or communicate decisions for yourself anymore.  If you become able to make decisions again, you can accept or refuse any treatment, no matter what you wrote in your living will. · Your state may offer an online registry. This is a place where you can store your living will online so the doctors and nurses who need to treat you can find it right away. What should you think about when creating a living will? Talk about your end-of-life wishes with your family members and your doctor. Let them know what you want. That way the people making decisions for you won't be surprised by your choices. Think about these questions as you make your living will:  · Do you know enough about life support methods that might be used? If not, talk to your doctor so you know what might be done if you can't breathe on your own, your heart stops, or you're unable to swallow. · What things would you still want to be able to do after you receive life-support methods? Would you want to be able to walk? To speak? To eat on your own? To live without the help of machines? · If you have a choice, where do you want to be cared for? In your home? At a hospital or nursing home? · Do you want certain Yarsani practices performed if you become very ill? · If you have a choice at the end of your life, where would you prefer to die? At home? In a hospital or nursing home? Somewhere else? · Would you prefer to be buried or cremated? · Do you want your organs to be donated after you die? What should you do with your living will? · Make sure that your family members and your health care agent have copies of your living will. · Give your doctor a copy of your living will to keep in your medical record. If you have more than one doctor, make sure that each one has a copy. · You may want to put a copy of your living will where it can be easily found. Where can you learn more? Go to http://loni-jemma.info/. Enter S344 in the search box to learn more about \"Learning About Living Pershar. \"  Current as of: April 19, 2018  Content Version: 11.8  © 1254-9469 [employer]. Care instructions adapted under license by [employer] (which disclaims liability or warranty for this information). If you have questions about a medical condition or this instruction, always ask your healthcare professional. [address] any warranty or liability for your use of this information. Patient history reviewed and went over updated information from spinal tap and MRV. Patient has improved which is fantastic news and that is what is important. Would suggest that she follow-up with [facility] so she has a regular ophthalmologist on board for critical regular checkups. Currently headaches are not an issue but I thought we could consider Topamax if that had to be an item of concern today. So we will just keep this out of the picture for now. Revisit in about 3 months. No

## 2023-10-23 ENCOUNTER — TELEPHONE (OUTPATIENT)
Age: 43
End: 2023-10-23

## 2023-10-23 ENCOUNTER — OFFICE VISIT (OUTPATIENT)
Age: 43
End: 2023-10-23

## 2023-10-23 VITALS
TEMPERATURE: 98.6 F | RESPIRATION RATE: 20 BRPM | HEART RATE: 96 BPM | OXYGEN SATURATION: 98 % | DIASTOLIC BLOOD PRESSURE: 88 MMHG | WEIGHT: 160 LBS | SYSTOLIC BLOOD PRESSURE: 128 MMHG

## 2023-10-23 DIAGNOSIS — J30.9 ALLERGIC SINUSITIS: Primary | ICD-10-CM

## 2023-10-23 LAB
STREP PYOGENES DNA, POC: NEGATIVE
VALID INTERNAL CONTROL, POC: YES

## 2023-10-23 RX ORDER — METHYLPREDNISOLONE 4 MG/1
4 TABLET ORAL SEE ADMIN INSTRUCTIONS
Qty: 1 KIT | Refills: 0 | Status: SHIPPED | OUTPATIENT
Start: 2023-10-23

## 2023-10-23 RX ORDER — BENZONATATE 100 MG/1
100 CAPSULE ORAL 3 TIMES DAILY PRN
Qty: 30 CAPSULE | Refills: 0 | Status: SHIPPED | OUTPATIENT
Start: 2023-10-23 | End: 2023-11-02

## 2023-10-23 RX ORDER — LIDOCAINE HYDROCHLORIDE 20 MG/ML
15 SOLUTION OROPHARYNGEAL PRN
Qty: 100 ML | Refills: 0 | Status: SHIPPED | OUTPATIENT
Start: 2023-10-23

## 2023-10-23 RX ORDER — DEXTROMETHORPHAN HYDROBROMIDE AND PROMETHAZINE HYDROCHLORIDE 15; 6.25 MG/5ML; MG/5ML
5 SYRUP ORAL 4 TIMES DAILY PRN
Qty: 118 ML | Refills: 0 | Status: SHIPPED | OUTPATIENT
Start: 2023-10-23 | End: 2023-10-30

## 2023-10-23 RX ORDER — FLUTICASONE PROPIONATE 50 MCG
1 SPRAY, SUSPENSION (ML) NASAL DAILY
COMMUNITY

## 2023-10-23 ASSESSMENT — ENCOUNTER SYMPTOMS
RHINORRHEA: 1
GASTROINTESTINAL NEGATIVE: 1
ALLERGIC/IMMUNOLOGIC NEGATIVE: 1
RESPIRATORY NEGATIVE: 1
EYES NEGATIVE: 1
SORE THROAT: 1

## 2023-10-23 NOTE — PROGRESS NOTES
10/23/2023   Donnie Shields (: 1980) is a 44 y.o. 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 1. Allergic sinusitis  -     AMB POC STREP GO A DIRECT, DNA PROBE  -     methylPREDNISolone (MEDROL, KELLI,) 4 MG tablet; Take 1 tablet by mouth See Admin Instructions Take by mouth., Disp-1 kit, R-0Normal  -     promethazine-dextromethorphan (PROMETHAZINE-DM) 6.25-15 MG/5ML syrup; Take 5 mLs by mouth 4 times daily as needed for Cough, Disp-118 mL, R-0Normal  -     benzonatate (TESSALON) 100 MG capsule; Take 1 capsule by mouth 3 times daily as needed for Cough, Disp-30 capsule, R-0Normal  -     lidocaine viscous hcl (XYLOCAINE) 2 % SOLN solution; Take 15 mLs by mouth as needed for Pain Gargle & spit. Do not swallow, Disp-100 mL, R-0Normal         Handout given with care instructions  2. OTC for symptom management. Increase fluid intake, ensure adequate nutritional intake. 3. Follow up with PCP as needed. 4. Go to ED with development of any acute symptoms. Follow up:  Return if symptoms worsen or fail to improve. Follow up immediately for any new, worsening or changes or if symptoms are not improving over the next 5-7 days. SUBJECTIVE/OBJECTIVE:    Pharyngitis  Associated symptoms: congestion, rhinorrhea and sore throat         Diagnoses and all orders for this visit:  Allergic sinusitis  Donnie Shields is a 44 y.o. female who complains of congestion and sore throat for few days. She denies a history of fatigue, fevers, myalgias, and shortness of breath and denies a history of asthma. Patient denies smoke cigarettes. Patient also noted that OTC

## 2024-02-20 ENCOUNTER — OFFICE VISIT (OUTPATIENT)
Age: 44
End: 2024-02-20
Payer: COMMERCIAL

## 2024-02-20 VITALS — SYSTOLIC BLOOD PRESSURE: 152 MMHG | DIASTOLIC BLOOD PRESSURE: 82 MMHG | WEIGHT: 158 LBS

## 2024-02-20 DIAGNOSIS — Z01.419 ENCOUNTER FOR GYNECOLOGICAL EXAMINATION WITHOUT ABNORMAL FINDING: Primary | ICD-10-CM

## 2024-02-20 PROCEDURE — 99396 PREV VISIT EST AGE 40-64: CPT | Performed by: OBSTETRICS & GYNECOLOGY

## 2024-02-20 NOTE — PROGRESS NOTES
Annual exam    Elvia Hughes is a 43 y.o.  presenting for annual exam. She is doing well, BP is a little elevated. Stressed about 14yo son.  All 5 kids at [facility]    Ob/Gyn Hx:  G 4 P 4  LMP - 2/7/2024  Menses - regular; with the following concerns: negative  Contraception - vasectomy  SA - yes    Health maintenance:  Pap - Last PAP was normal; February/2022.  Mammo - Mammogram was normal, June/2023.  Colonoscopy - No prior colonoscopy      Past Medical History:   Diagnosis Date    Abnormal Pap smear     2003 had leep    Headache     Phlebitis and thrombophlebitis of unspecified site     pubic area and lower leg varicosities    Tinnitus        Past Surgical History:   Procedure Laterality Date    DILATION AND CURETTAGE OF UTERUS      HERNIA REPAIR  child    umbilical    LEEP      WISDOM TOOTH EXTRACTION         Family History   Problem Relation Age of Onset    Seizures Paternal Grandfather     Stroke Paternal Grandmother     Cancer Maternal Grandfather     Cancer Maternal Grandmother     Asthma Brother     Hypertension Father     Asthma Father     Heart Disease Father        Social History     Socioeconomic History    Marital status:      Spouse name: Not on file    Number of children: Not on file    Years of education: Not on file    Highest education level: Not on file   Occupational History    Not on file   Tobacco Use    Smoking status: Never    Smokeless tobacco: Never   Substance and Sexual Activity    Alcohol use: Yes     Alcohol/week: 1.0 standard drink of alcohol    Drug use: No    Sexual activity: Not on file     Comment: Vasectomy   Other Topics Concern    Not on file   Social History Narrative    Not on file     Social Determinants of Health     Financial Resource Strain: Not on file   Food Insecurity: Not on file   Transportation Needs: Not on file   Physical Activity: Not on file   Stress: Not on file   Social Connections: Not on file   Intimate Partner Violence: Not on file   Housing

## 2024-02-27 LAB
., LABCORP: NORMAL
CYTOLOGIST CVX/VAG CYTO: NORMAL
CYTOLOGY CVX/VAG DOC CYTO: NORMAL
CYTOLOGY CVX/VAG DOC THIN PREP: NORMAL
DX ICD CODE: NORMAL
Lab: NORMAL
Lab: NORMAL
OTHER STN SPEC: NORMAL
STAT OF ADQ CVX/VAG CYTO-IMP: NORMAL

## 2024-03-13 ENCOUNTER — OFFICE VISIT (OUTPATIENT)
Age: 44
End: 2024-03-13
Payer: COMMERCIAL

## 2024-03-13 VITALS — WEIGHT: 158 LBS | DIASTOLIC BLOOD PRESSURE: 72 MMHG | SYSTOLIC BLOOD PRESSURE: 116 MMHG

## 2024-03-13 DIAGNOSIS — R87.615 UNSATISFACTORY CERVICAL PAPANICOLAOU SMEAR: Primary | ICD-10-CM

## 2024-03-13 PROCEDURE — 99212 OFFICE O/P EST SF 10 MIN: CPT | Performed by: OBSTETRICS & GYNECOLOGY

## 2024-03-13 NOTE — PROGRESS NOTES
black or bloody stools  : negative for frequency, dysuria, hematuria  MSK: negative for back pain, joint pain, muscle pain  Skin: negative for itching, rash, hives  Neuro: negative for dizziness, headache, confusion, weakness  Psych: negative for anxiety, depression, change in mood  Heme/lymph: negative for bleeding, bruising, pallor      Objective:  There were no vitals taken for this visit.       PHYSICAL EXAMINATION    Constitutional  Appearance: well-nourished, well developed, alert, in no acute distress    HENT  Head and Face: appears normal         Gastrointestinal  Abdominal Examination: abdomen non-tender to palpation, normal bowel sounds, no masses present  Liver and spleen: no hepatomegaly present, spleen not palpable  Hernias: no hernias identified         Skin  General Inspection: no rash, no lesions identified    Neurologic/Psychiatric  Mental Status:  Orientation: grossly oriented to person, place and time  Mood and Affect: mood normal, affect appropriate    Assessment:    Hx unsatisfactory pap    Plan:   Pap done  Patient declines presence of chaperone during today's visit.

## 2024-03-17 LAB
., LABCORP: NORMAL
CYTOLOGIST CVX/VAG CYTO: NORMAL
CYTOLOGY CVX/VAG DOC CYTO: NORMAL
CYTOLOGY CVX/VAG DOC THIN PREP: NORMAL
DX ICD CODE: NORMAL
Lab: NORMAL
OTHER STN SPEC: NORMAL
STAT OF ADQ CVX/VAG CYTO-IMP: NORMAL

## 2024-06-07 ENCOUNTER — HOSPITAL ENCOUNTER (OUTPATIENT)
Facility: HOSPITAL | Age: 44
Discharge: HOME | End: 2024-06-07
Attending: OBSTETRICS & GYNECOLOGY
Payer: COMMERCIAL

## 2024-06-07 VITALS — WEIGHT: 160 LBS | BODY MASS INDEX: 24.25 KG/M2 | HEIGHT: 68 IN

## 2024-06-07 DIAGNOSIS — Z12.31 SCREENING MAMMOGRAM FOR HIGH-RISK PATIENT: ICD-10-CM

## 2024-06-07 PROCEDURE — 77063 BREAST TOMOSYNTHESIS BI: CPT
